# Patient Record
Sex: FEMALE | URBAN - METROPOLITAN AREA
[De-identification: names, ages, dates, MRNs, and addresses within clinical notes are randomized per-mention and may not be internally consistent; named-entity substitution may affect disease eponyms.]

---

## 2023-06-27 ENCOUNTER — HOSPITAL ENCOUNTER (OUTPATIENT)
Age: 43
Setting detail: SPECIMEN
Discharge: HOME | End: 2023-06-27

## 2023-06-27 ENCOUNTER — OFFICE VISIT (OUTPATIENT)
Dept: OBGYN CLINIC | Age: 43
End: 2023-06-27
Payer: COMMERCIAL

## 2023-06-27 VITALS
SYSTOLIC BLOOD PRESSURE: 137 MMHG | HEIGHT: 66 IN | WEIGHT: 231 LBS | HEART RATE: 87 BPM | DIASTOLIC BLOOD PRESSURE: 96 MMHG | BODY MASS INDEX: 37.12 KG/M2

## 2023-06-27 DIAGNOSIS — K42.9 UMBILICAL HERNIA WITHOUT OBSTRUCTION AND WITHOUT GANGRENE: ICD-10-CM

## 2023-06-27 DIAGNOSIS — N83.201 RIGHT OVARIAN CYST: ICD-10-CM

## 2023-06-27 DIAGNOSIS — R19.00 PELVIC MASS: ICD-10-CM

## 2023-06-27 DIAGNOSIS — R19.00 PELVIC MASS: Primary | ICD-10-CM

## 2023-06-27 DIAGNOSIS — R10.33 PERIUMBILICAL ABDOMINAL PAIN: ICD-10-CM

## 2023-06-27 DIAGNOSIS — R68.81 EARLY SATIETY: ICD-10-CM

## 2023-06-27 LAB
CANCER AG125 SERPL-ACNC: 24 U/ML
CANCER AG19-9 SERPL IA-ACNC: 24 U/ML (ref 0–35)
CEA SERPL-MCNC: 2.5 NG/ML
LDH SERPL-CCNC: 178 U/L (ref 135–214)

## 2023-06-27 PROCEDURE — 99204 OFFICE O/P NEW MOD 45 MIN: CPT | Performed by: OBSTETRICS & GYNECOLOGY

## 2023-06-27 RX ORDER — LISINOPRIL 5 MG/1
5 TABLET ORAL DAILY
COMMUNITY
Start: 2023-06-16

## 2023-06-27 RX ORDER — SIMVASTATIN 40 MG
40 TABLET ORAL DAILY
COMMUNITY
Start: 2023-05-01

## 2023-06-27 RX ORDER — SERTRALINE HYDROCHLORIDE 100 MG/1
100 TABLET, FILM COATED ORAL DAILY
COMMUNITY
Start: 2023-04-27

## 2023-06-27 RX ORDER — LEVOTHYROXINE SODIUM 200 MCG
200 TABLET ORAL DAILY
COMMUNITY
Start: 2023-05-04

## 2023-06-27 RX ORDER — INSULIN GLARGINE 300 U/ML
INJECTION, SOLUTION SUBCUTANEOUS
COMMUNITY
Start: 2023-06-17

## 2023-06-27 RX ORDER — BREXPIPRAZOLE 1 MG/1
1 TABLET ORAL DAILY
COMMUNITY
Start: 2023-06-16

## 2023-06-27 RX ORDER — HYDROCHLOROTHIAZIDE 12.5 MG/1
12.5 TABLET ORAL DAILY
COMMUNITY
Start: 2023-06-22

## 2023-06-28 LAB
HPV I/H RISK 4 DNA CVX QL NAA+PROBE: DETECTED
HPV SAMPLE: ABNORMAL
HPV, INTERPRETATION: ABNORMAL
HPV16 DNA CVX QL NAA+PROBE: NOT DETECTED
HPV18 DNA CVX QL NAA+PROBE: NOT DETECTED
SPECIMEN DESCRIPTION: ABNORMAL

## 2023-06-29 DIAGNOSIS — R19.00 PELVIC MASS: Primary | ICD-10-CM

## 2023-06-29 LAB — CANCER AG15-3 SERPL-ACNC: 17 U/ML (ref 0–31)

## 2023-07-03 LAB — CYTOLOGY REPORT: NORMAL

## 2023-07-05 ENCOUNTER — HOSPITAL ENCOUNTER (OUTPATIENT)
Dept: ULTRASOUND IMAGING | Facility: CLINIC | Age: 43
Discharge: HOME | End: 2023-07-07
Payer: COMMERCIAL

## 2023-07-05 ENCOUNTER — HOSPITAL ENCOUNTER (OUTPATIENT)
Dept: CT IMAGING | Facility: CLINIC | Age: 43
Discharge: HOME | End: 2023-07-07
Attending: OBSTETRICS & GYNECOLOGY
Payer: COMMERCIAL

## 2023-07-05 DIAGNOSIS — R19.00 PELVIC MASS: ICD-10-CM

## 2023-07-05 LAB
CREAT SERPL-MCNC: 0.5 MG/DL (ref 0.5–0.9)
GFR SERPL CREATININE-BSD FRML MDRD: >60 ML/MIN/1.73M2

## 2023-07-05 PROCEDURE — 36415 COLL VENOUS BLD VENIPUNCTURE: CPT

## 2023-07-05 PROCEDURE — 2580000003 HC RX 258: Performed by: OBSTETRICS & GYNECOLOGY

## 2023-07-05 PROCEDURE — 82565 ASSAY OF CREATININE: CPT

## 2023-07-05 PROCEDURE — 6360000004 HC RX CONTRAST MEDICATION: Performed by: OBSTETRICS & GYNECOLOGY

## 2023-07-05 PROCEDURE — 76856 US EXAM PELVIC COMPLETE: CPT

## 2023-07-05 PROCEDURE — 71260 CT THORAX DX C+: CPT

## 2023-07-05 RX ORDER — 0.9 % SODIUM CHLORIDE 0.9 %
70 INTRAVENOUS SOLUTION INTRAVENOUS ONCE
Status: COMPLETED | OUTPATIENT
Start: 2023-07-05 | End: 2023-07-05

## 2023-07-05 RX ORDER — SODIUM CHLORIDE 0.9 % (FLUSH) 0.9 %
10 SYRINGE (ML) INJECTION PRN
Status: DISCONTINUED | OUTPATIENT
Start: 2023-07-05 | End: 2023-07-08 | Stop reason: HOSPADM

## 2023-07-05 RX ADMIN — IOPAMIDOL 75 ML: 755 INJECTION, SOLUTION INTRAVENOUS at 15:28

## 2023-07-05 RX ADMIN — SODIUM CHLORIDE 70 ML: 9 INJECTION, SOLUTION INTRAVENOUS at 15:27

## 2023-07-05 RX ADMIN — SODIUM CHLORIDE, PRESERVATIVE FREE 10 ML: 5 INJECTION INTRAVENOUS at 15:26

## 2023-07-10 ENCOUNTER — TELEPHONE (OUTPATIENT)
Dept: OBGYN CLINIC | Age: 43
End: 2023-07-10

## 2023-07-10 NOTE — TELEPHONE ENCOUNTER
GYN pt last seen 6/27/23     US was ordered along on 7/5 with some labs and pt was wondering what it meant by benign characteristics and was also wondering the results to her labs. Pls advise.

## 2023-07-11 DIAGNOSIS — R10.33 PERIUMBILICAL ABDOMINAL PAIN: Primary | ICD-10-CM

## 2023-07-11 DIAGNOSIS — K42.9 UMBILICAL HERNIA WITHOUT OBSTRUCTION AND WITHOUT GANGRENE: ICD-10-CM

## 2023-07-14 ENCOUNTER — TELEPHONE (OUTPATIENT)
Dept: OBGYN CLINIC | Age: 43
End: 2023-07-14

## 2023-07-14 NOTE — TELEPHONE ENCOUNTER
Referred to General surgeon but has not heard back from their office. Gave pt number to Dr. Delphine Gilbert office. Asked pt to call back if they don't see her referral and get a fax number. Pt agreeable to POC.

## 2023-07-18 ENCOUNTER — TELEPHONE (OUTPATIENT)
Dept: OBGYN CLINIC | Age: 43
End: 2023-07-18

## 2023-07-18 NOTE — TELEPHONE ENCOUNTER
GYN pt calling in stating she is having surgery for an ovari removal and was wondering if she was having both or one removed as she would just want both removed just incase as she has dealt with a previous surgery of thyroid cancer and them doing one side and them having to do a whole other surgery to do the other. Pt also states that she seen Dr Delphine Gilbert yesterday and states he wants to do his surgery the same day for hernia repair and would like top collaborate to schedule that surgery.

## 2023-07-19 ENCOUNTER — TELEPHONE (OUTPATIENT)
Dept: OBGYN CLINIC | Age: 43
End: 2023-07-19

## 2023-07-19 NOTE — TELEPHONE ENCOUNTER
GYN pt having robotic surgery for hyst 8/29 pt called in with questions in regards to length of recovery for surgery? How long she will have to stay in the hospital? 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 Pt also wants to know if she should take her medication the morning of surgery?

## 2023-07-19 NOTE — TELEPHONE ENCOUNTER
Gyn pt     Scheduled for surgery 8.29.23 @0900   PAT 8.15.23 @ [address] 1300     Arrive 2hrs early 0700  NPO after midnight

## 2023-08-05 NOTE — PATIENT INSTRUCTIONS
Please carefully follow all the preoperative instructions given to you. Plan on returning to the office 1-2 weeks after surgery. Please call the office if you have any questions or concerns before or after surgery.

## 2023-08-07 ENCOUNTER — INITIAL CONSULT (OUTPATIENT)
Dept: OBGYN CLINIC | Age: 43
End: 2023-08-07
Payer: COMMERCIAL

## 2023-08-07 VITALS
SYSTOLIC BLOOD PRESSURE: 134 MMHG | BODY MASS INDEX: 37.77 KG/M2 | DIASTOLIC BLOOD PRESSURE: 96 MMHG | WEIGHT: 235 LBS | HEIGHT: 66 IN | HEART RATE: 82 BPM

## 2023-08-07 DIAGNOSIS — Z01.818 PREOPERATIVE EXAM FOR GYNECOLOGIC SURGERY: ICD-10-CM

## 2023-08-07 DIAGNOSIS — N83.201 RIGHT OVARIAN CYST: ICD-10-CM

## 2023-08-07 DIAGNOSIS — R19.00 PELVIC MASS: Primary | ICD-10-CM

## 2023-08-07 DIAGNOSIS — R10.2 PELVIC PRESSURE IN FEMALE: ICD-10-CM

## 2023-08-07 DIAGNOSIS — K42.9 UMBILICAL HERNIA WITHOUT OBSTRUCTION AND WITHOUT GANGRENE: ICD-10-CM

## 2023-08-07 PROCEDURE — 99213 OFFICE O/P EST LOW 20 MIN: CPT | Performed by: OBSTETRICS & GYNECOLOGY

## 2023-08-07 RX ORDER — LORATADINE 10 MG/1
CAPSULE, LIQUID FILLED ORAL DAILY
COMMUNITY

## 2023-08-07 RX ORDER — ZINC GLUCONATE 50 MG
50 TABLET ORAL DAILY
COMMUNITY

## 2023-08-07 RX ORDER — FLUTICASONE PROPIONATE 50 MCG
1 SPRAY, SUSPENSION (ML) NASAL DAILY
COMMUNITY

## 2023-08-07 RX ORDER — ASPIRIN 81 MG/1
81 TABLET, CHEWABLE ORAL DAILY
COMMUNITY

## 2023-08-07 RX ORDER — PEN NEEDLE, DIABETIC 32GX 5/32"
NEEDLE, DISPOSABLE MISCELLANEOUS
COMMUNITY
Start: 2023-08-01

## 2023-08-07 NOTE — PROGRESS NOTES
[facility] [place]ural  [address] [place] [address]      DATE OF VISIT:  23        History and Physical    Bernardo Vásquez    :  1980  CHIEF COMPLAINT:  No chief complaint on file. HPI :   Bernardo Vásquez is a 37 y.o. femaleGRAVIDA 2 PARA 1011   PCP: Garrett Shaheen was initially evaluated in the office as a new visit referral from her general surgeon. She was referred to him by her PCP to evaluate a possible hernia. An abdominal/pelvic ultrasound was done that showed an incidental right ovarian cyst measuring 13 x 10 cm. No hernia was visualized and no other information was commented on? She states that she had been experiencing intermittent umbilical discomfort and noticed a bulge in her abdomen above the umbilicus. The pain is sharp, random and not exacerbated with any physical activities. Pain does not radiate and no associated nausea or vomiting. With meals however she does feel early fullness, bloating and occasionally gassy. She is having regular bowel movements without constipation or diarrhea. She does sometimes feel after urination that she is incompletely emptied her bladder but denies any UTI symptoms or involuntary loss of urine. She does have menstrual cycles approximately every 26-28 days lasting 5 days on average with moderate flow. She denies any intermenstrual bleeding or history of abnormal Paps. She is sexually active and has completed her childbearing. Last mammogram was in March and WNL. She does have a history of thyroid cancer with thyroidectomy and is currently on replacement. TFTs last month she reported as being WNL. She is otherwise without any significant complaints today. A referral was made to Dr. Cecily Almanzar who is seen, evaluated her and has consented her for a combination robotic umbilical herniorrhaphy.     Narrative

## 2023-08-10 RX ORDER — SODIUM CHLORIDE, SODIUM LACTATE, POTASSIUM CHLORIDE, CALCIUM CHLORIDE 600; 310; 30; 20 MG/100ML; MG/100ML; MG/100ML; MG/100ML
INJECTION, SOLUTION INTRAVENOUS CONTINUOUS
OUTPATIENT
Start: 2023-08-10

## 2023-08-10 NOTE — DISCHARGE INSTRUCTIONS
disease (chicken pox, measles, etc.) Please call your doctor before coming to the hospital.      If your child is having surgery please make arrangements for any other children to be cared for at home on the day of surgery. Other children are not permitted in recovery room and we want you to be able to spend time with the patient. If other arrangements are not available then we suggest that you have a second adult to stay in the waiting room.       If you have any other questions regarding your procedure or the day of surgery, please call [phone], or [phone]

## 2023-08-11 ENCOUNTER — TELEPHONE (OUTPATIENT)
Dept: OBGYN CLINIC | Age: 43
End: 2023-08-11

## 2023-08-11 NOTE — TELEPHONE ENCOUNTER
Not sure who this would go to as Sherlyn and Judy Earl both are off [facility] [place].

## 2023-08-11 NOTE — TELEPHONE ENCOUNTER
Hi, I have both of you working with Dr. Marcelo Small Monday and Tuesday. Can either of you make sure orders are placed for this patient.  PAT is scheduled for Tuesday at 1pm.

## 2023-08-15 ENCOUNTER — HOSPITAL ENCOUNTER (OUTPATIENT)
Age: 43
Discharge: HOME | End: 2023-08-15
Payer: COMMERCIAL

## 2023-08-15 ENCOUNTER — HOSPITAL ENCOUNTER (OUTPATIENT)
Dept: PREADMISSION TESTING | Age: 43
Discharge: HOME | End: 2023-08-19
Payer: COMMERCIAL

## 2023-08-15 VITALS
BODY MASS INDEX: 37.77 KG/M2 | DIASTOLIC BLOOD PRESSURE: 78 MMHG | HEIGHT: 66 IN | HEART RATE: 68 BPM | OXYGEN SATURATION: 98 % | TEMPERATURE: 97.4 F | SYSTOLIC BLOOD PRESSURE: 126 MMHG | RESPIRATION RATE: 16 BRPM | WEIGHT: 235 LBS

## 2023-08-15 DIAGNOSIS — N83.201 RIGHT OVARIAN CYST: ICD-10-CM

## 2023-08-15 DIAGNOSIS — K42.9 UMBILICAL HERNIA WITHOUT OBSTRUCTION AND WITHOUT GANGRENE: ICD-10-CM

## 2023-08-15 DIAGNOSIS — R19.00 PELVIC MASS: ICD-10-CM

## 2023-08-15 DIAGNOSIS — Z01.818 PREOPERATIVE EXAM FOR GYNECOLOGIC SURGERY: ICD-10-CM

## 2023-08-15 DIAGNOSIS — R10.2 PELVIC PRESSURE IN FEMALE: ICD-10-CM

## 2023-08-15 LAB
ABO + RH BLD: NORMAL
ALBUMIN SERPL-MCNC: 4.4 G/DL (ref 3.5–5.2)
ALBUMIN/GLOB SERPL: 1.6 {RATIO} (ref 1–2.5)
ALP SERPL-CCNC: 77 U/L (ref 35–104)
ALT SERPL-CCNC: 28 U/L (ref 5–33)
ANION GAP SERPL CALCULATED.3IONS-SCNC: 14 MMOL/L (ref 9–17)
ARM BAND NUMBER: NORMAL
AST SERPL-CCNC: 20 U/L
B-HCG SERPL EIA 3RD IS-ACNC: <1 MIU/ML
BASOPHILS # BLD: 0.13 K/UL (ref 0–0.2)
BASOPHILS NFR BLD: 2 % (ref 0–2)
BILIRUB SERPL-MCNC: 0.3 MG/DL (ref 0.3–1.2)
BLOOD BANK SAMPLE EXPIRATION: NORMAL
BLOOD GROUP ANTIBODIES SERPL: NEGATIVE
BUN SERPL-MCNC: 9 MG/DL (ref 6–20)
CALCIUM SERPL-MCNC: 9.3 MG/DL (ref 8.6–10.4)
CHLORIDE SERPL-SCNC: 100 MMOL/L (ref 98–107)
CO2 SERPL-SCNC: 23 MMOL/L (ref 20–31)
CREAT SERPL-MCNC: 0.7 MG/DL (ref 0.5–0.9)
EOSINOPHIL # BLD: 0.3 K/UL (ref 0–0.44)
EOSINOPHILS RELATIVE PERCENT: 5 % (ref 1–4)
ERYTHROCYTE [DISTWIDTH] IN BLOOD BY AUTOMATED COUNT: 12.3 % (ref 11.8–14.4)
GFR SERPL CREATININE-BSD FRML MDRD: >60 ML/MIN/1.73M2
GLUCOSE SERPL-MCNC: 356 MG/DL (ref 70–99)
HCG UR QL: NEGATIVE
HCT VFR BLD AUTO: 43.8 % (ref 36.3–47.1)
HGB BLD-MCNC: 15 G/DL (ref 11.9–15.1)
IMM GRANULOCYTES # BLD AUTO: <0.03 K/UL (ref 0–0.3)
IMM GRANULOCYTES NFR BLD: 0 %
LYMPHOCYTES NFR BLD: 2 K/UL (ref 1.1–3.7)
LYMPHOCYTES RELATIVE PERCENT: 32 % (ref 24–43)
MCH RBC QN AUTO: 31.4 PG (ref 25.2–33.5)
MCHC RBC AUTO-ENTMCNC: 34.2 G/DL (ref 28.4–34.8)
MCV RBC AUTO: 91.8 FL (ref 82.6–102.9)
MONOCYTES NFR BLD: 0.46 K/UL (ref 0.1–1.2)
MONOCYTES NFR BLD: 7 % (ref 3–12)
NEUTROPHILS NFR BLD: 54 % (ref 36–65)
NEUTS SEG NFR BLD: 3.34 K/UL (ref 1.5–8.1)
NRBC BLD-RTO: 0 PER 100 WBC
PLATELET # BLD AUTO: 210 K/UL (ref 138–453)
PMV BLD AUTO: 11.7 FL (ref 8.1–13.5)
POTASSIUM SERPL-SCNC: 4.4 MMOL/L (ref 3.7–5.3)
PROT SERPL-MCNC: 7.1 G/DL (ref 6.4–8.3)
RBC # BLD AUTO: 4.77 M/UL (ref 3.95–5.11)
SODIUM SERPL-SCNC: 137 MMOL/L (ref 135–144)
WBC OTHER # BLD: 6.2 K/UL (ref 3.5–11.3)

## 2023-08-15 PROCEDURE — 36415 COLL VENOUS BLD VENIPUNCTURE: CPT

## 2023-08-15 PROCEDURE — 86900 BLOOD TYPING SEROLOGIC ABO: CPT

## 2023-08-15 PROCEDURE — 81025 URINE PREGNANCY TEST: CPT

## 2023-08-15 PROCEDURE — 86850 RBC ANTIBODY SCREEN: CPT

## 2023-08-15 PROCEDURE — 84702 CHORIONIC GONADOTROPIN TEST: CPT

## 2023-08-15 PROCEDURE — 86901 BLOOD TYPING SEROLOGIC RH(D): CPT

## 2023-08-15 PROCEDURE — 80053 COMPREHEN METABOLIC PANEL: CPT

## 2023-08-15 PROCEDURE — 85025 COMPLETE CBC W/AUTO DIFF WBC: CPT

## 2023-08-15 RX ORDER — ENOXAPARIN SODIUM 100 MG/ML
40 INJECTION SUBCUTANEOUS ONCE
OUTPATIENT
Start: 2023-08-15 | End: 2023-08-15

## 2023-08-15 ASSESSMENT — PAIN SCALES - GENERAL: PAINLEVEL_OUTOF10: 2

## 2023-08-15 ASSESSMENT — PAIN DESCRIPTION - LOCATION: LOCATION: ABDOMEN

## 2023-08-15 ASSESSMENT — PAIN DESCRIPTION - ORIENTATION: ORIENTATION: LOWER

## 2023-08-15 NOTE — PROGRESS NOTES
Anesthesia Focused Assessment    STOP-BANG Sleep Apnea Questionnaire    SNORE loudly (heard through closed doors)? No  TIRED, fatigued, sleepy during daytime? No  OBSERVED stopping breathing during sleep? No  High blood PRESSURE being treated? Yes    BMI over 35? Yes  AGE over 48? No  NECK circumference over 16\"? No  GENDER (male)? No             Total 2  High risk 5-8  Intermediate risk 3-4  Low risk 0-2    Obstructive Sleep Apnea: denies  If YES, machine used: no     Type 1 DM:   no  T2DM:  yes    Coronary Artery Disease:  no  Hypertension:  yes    Active smoker:  no  Drinks Alcohol:  no    Dentition: benign    Defib / AICD / Pacemaker: no      Renal Failure/dialysis:  no    Patient was evaluated in PAT & anesthesia guidelines were applied. NPO guidelines, medication instructions and scheduled arrival time were reviewed with patient. I advised patient to please contact the surgeon's office, ahead of time if possible, if any new signs or symptoms of illness, infection, rash, etc    Hx of anesthesia complications:  PONV - scop patch helped  Family hx of anesthesia complications:  no                                                                                                                     Patient is active without cardiac or pulmonary complaints.     Deanna Blue PA-C  8/15/23  12:51 PM

## 2023-08-15 NOTE — PROGRESS NOTES
Notified Levon Simmonds of 356 glucose she will start patient on short acting insulin/sliding scale.

## 2023-08-16 LAB
EKG ATRIAL RATE: 65 BPM
EKG P AXIS: 9 DEGREES
EKG P-R INTERVAL: 154 MS
EKG Q-T INTERVAL: 422 MS
EKG QRS DURATION: 88 MS
EKG QTC CALCULATION (BAZETT): 438 MS
EKG R AXIS: 4 DEGREES
EKG T AXIS: 11 DEGREES
EKG VENTRICULAR RATE: 65 BPM

## 2023-08-17 ENCOUNTER — ANESTHESIA EVENT (OUTPATIENT)
Dept: OPERATING ROOM | Age: 43
End: 2023-08-17
Payer: COMMERCIAL

## 2023-08-17 ENCOUNTER — TELEPHONE (OUTPATIENT)
Dept: OBGYN CLINIC | Age: 43
End: 2023-08-17

## 2023-08-17 NOTE — TELEPHONE ENCOUNTER
36 y/o Surgical pt calling, returning call back to Dr. Brigitte Wesley. Pt state's she is unsure why doctor is calling because she has not questions and it ready for surgery. Informed pt that doctor is in with pt at this time and will call her back.

## 2023-08-17 NOTE — TELEPHONE ENCOUNTER
Called pt back to discuss her elevated . Surgery could potentially get cancelled if her  BS are not controlled. Pt states SV must have contacted her PCP office regarding this value because he meds have changed. Her BS values have gone down. She has been charting them. Asked pt to get clearance from her PCP. Gave pt fax number to office after she is seen by her PCP. Informed pt that she will need to bring her meds in the day of surgery. Pt verbalizes understanding. Pt asking what number value would anesthesia except to be able to do surgery. Informed pt this writer doesn't know the cut off.

## 2023-08-28 NOTE — H&P
OB/GYN Pre-Op H&P  Juanita Barrow    Patient Name: Dorota Andujar     Patient : 1980  Room/Bed: 181 [facility]  Admission Date/Time: 2023  6:31 AM  Primary Care Physician: Darrian Williamson  MRN: [id number]    Date: 2023  Time: 8:53 AM    The patient was seen in pre-op holding. She is here for a previously scheduled exploratory laparotomy, removal of pelvic mass, removal of right tuba and ovary, removal of left tube, possible cancer staging. Patient was referred to OB/GYN by general surgery after evaluation for umbilical hernia repair and patient was found to have a right adnexal cyst measuring [id number]. Patient will have definitive surgical management for removal of pelvic mass. She has completed her childbearing and additionally desires to have ovarian cancer reduction salpingectomy on the left side. The procedure risks and complications were reviewed. The labs, Consent, and H&P were reviewed and updated. The patient was counseled on the possibility of  the need of a second surgery. The patient voiced understanding and had all of her questions answered. The possibility of incomplete removal of abnormal tissue was discussed. OBSTETRICAL HISTORY:   OB History    Para Term  AB Living   2 2 1 1 0 1   SAB IAB Ectopic Molar Multiple Live Births   0 0 0 0 0 2      # Outcome Date GA Lbr Adam/2nd Weight Sex Delivery Anes PTL Lv   2       Vag-Spont   ND   1 Term      Vag-Spont   TELLY       PAST MEDICAL HISTORY:   has a past medical history of Diabetes mellitus ([address]), Environmental allergies, Hx of thyroid cancer, Hyperlipidemia, Hypertension, Ovarian cyst, PONV (postoperative nausea and vomiting), Thyroid disease, and Wellness examination. PAST SURGICAL HISTORY:   has a past surgical history that includes Cholecystectomy (N/A, ); Thyroidectomy (Right, 2012); Mole removal (Left); and Thyroidectomy (Left, 2012).     ALLERGIES:  Allergies as of

## 2023-08-28 NOTE — H&P
[facility] [place]  [address] [place]    :  1980      Updated CHIEF COMPLAINT: HPI   DATE 23    Zunilda Montenegro is a 37 y.o. femaleGRAVIDA 2 PARA 1011     PCP: Jaida Seda was initially evaluated in the office as a new visit referral from her general surgeon. She was referred to him by her PCP to evaluate a possible hernia. An abdominal/pelvic ultrasound was done that showed an incidental right ovarian cyst measuring 13 x 10 cm. No hernia was visualized and no other information was commented on? She states that she had been experiencing intermittent umbilical discomfort and noticed a bulge in her abdomen above the umbilicus. The pain is sharp, random and not exacerbated with any physical activities. Pain does not radiate and no associated nausea or vomiting. With meals however she does feel early fullness, bloating and occasionally gassy. She is having regular bowel movements without constipation or diarrhea. She does sometimes feel after urination that she is incompletely emptied her bladder but denies any UTI symptoms or involuntary loss of urine. She does have menstrual cycles approximately every 26-28 days lasting 5 days on average with moderate flow. She denies any intermenstrual bleeding or history of abnormal Paps. She is sexually active and has completed her childbearing. Last mammogram was in March and WNL. She does have a history of thyroid cancer with thyroidectomy and is currently on replacement. TFTs last month she reported as being WNL. She is otherwise without any significant complaints today. A referral was made to Dr. Jesus Arreola who's seen, evaluated her and has consented her for a combination robotic umbilical herniorrhaphy.      Narrative & Impression  EXAMINATION:  TRANSABDOMINAL AND TRANSVAGINAL PELVIC Cervical Cancer Neg Hx      Vaginal Cancer Neg Hx      Colon Cancer Neg Hx           Social History          Tobacco Use   Smoking Status Never   Smokeless Tobacco Never      Social History          Substance and Sexual Activity   Alcohol Use Not Currently      Current Facility-Administered Medications          Current Outpatient Medications   Medication Sig Dispense Refill    hydroCHLOROthiazide (HYDRODIURIL) 12.5 MG tablet Take 1 tablet by mouth daily        REXULTI 1 MG TABS tablet Take 1 tablet by mouth daily        TOUJEO SOLOSTAR 300 UNIT/ML concentrated injection pen INJECT 10 UNITS SUBCUTANEOUSLY ONCE DAILY        SYNTHROID 200 MCG tablet Take 1 tablet by mouth daily        lisinopril (PRINIVIL;ZESTRIL) 5 MG tablet Take 1 tablet by mouth daily        metFORMIN (GLUCOPHAGE) 500 MG tablet Take 1 tablet by mouth 2 times daily        sertraline (ZOLOFT) 100 MG tablet Take 1 tablet by mouth daily        simvastatin (ZOCOR) 40 MG tablet Take 1 tablet by mouth daily          No current facility-administered medications for this visit. Allergies:  No Known Allergies     Gynecologic History:  No LMP recorded. (Menstrual status: Irregular periods).   Sexually Active: Yes  STD History: No  Abnormal Pap History no  Birth Control: No              OB History    Para Term  AB Living   2 2 1 1 0 1   SAB IAB Ectopic Molar Multiple Live Births    0 0 0 0 0 2      ______________________________________________________________________  REVIEW OF SYSTEMS:        Constitutional:             Unexpected weight change    no  Neurological:               Frequent headaches               no  Ophthalmic:                 Recent visual changes           no  ENT:                            Difficulty swallowing                no  Breast:                         Masses                                   no                                  Respiratory:                 Shortness of breath                no

## 2023-08-29 ENCOUNTER — HOSPITAL ENCOUNTER (OUTPATIENT)
Age: 43
Setting detail: OUTPATIENT SURGERY
Discharge: HOME | End: 2023-08-29
Attending: OBSTETRICS & GYNECOLOGY | Admitting: OBSTETRICS & GYNECOLOGY
Payer: COMMERCIAL

## 2023-08-29 ENCOUNTER — ANESTHESIA (OUTPATIENT)
Dept: OPERATING ROOM | Age: 43
End: 2023-08-29
Payer: COMMERCIAL

## 2023-08-29 VITALS
HEIGHT: 66 IN | HEART RATE: 101 BPM | BODY MASS INDEX: 36.96 KG/M2 | DIASTOLIC BLOOD PRESSURE: 76 MMHG | SYSTOLIC BLOOD PRESSURE: 103 MMHG | TEMPERATURE: 97.3 F | OXYGEN SATURATION: 92 % | WEIGHT: 230 LBS | RESPIRATION RATE: 12 BRPM

## 2023-08-29 DIAGNOSIS — N83.201 CYST OF RIGHT OVARY: ICD-10-CM

## 2023-08-29 DIAGNOSIS — R19.00 PELVIC MASS: ICD-10-CM

## 2023-08-29 DIAGNOSIS — K42.9 UMBILICAL HERNIA WITHOUT OBSTRUCTION AND WITHOUT GANGRENE: ICD-10-CM

## 2023-08-29 DIAGNOSIS — G89.18 POST-OP PAIN: Primary | ICD-10-CM

## 2023-08-29 PROBLEM — Z98.890 POST-OPERATIVE STATE: Status: ACTIVE | Noted: 2023-08-29

## 2023-08-29 LAB
GLUCOSE BLD-MCNC: 203 MG/DL (ref 65–105)
GLUCOSE BLD-MCNC: 276 MG/DL (ref 65–105)
HCG, PREGNANCY URINE (POC): NEGATIVE

## 2023-08-29 PROCEDURE — 7100000000 HC PACU RECOVERY - FIRST 15 MIN: Performed by: OBSTETRICS & GYNECOLOGY

## 2023-08-29 PROCEDURE — 3600000014 HC SURGERY LEVEL 4 ADDTL 15MIN: Performed by: OBSTETRICS & GYNECOLOGY

## 2023-08-29 PROCEDURE — 2580000003 HC RX 258: Performed by: NURSE ANESTHETIST, CERTIFIED REGISTERED

## 2023-08-29 PROCEDURE — 81025 URINE PREGNANCY TEST: CPT

## 2023-08-29 PROCEDURE — 3700000000 HC ANESTHESIA ATTENDED CARE: Performed by: OBSTETRICS & GYNECOLOGY

## 2023-08-29 PROCEDURE — 6360000002 HC RX W HCPCS: Performed by: SURGERY

## 2023-08-29 PROCEDURE — 88305 TISSUE EXAM BY PATHOLOGIST: CPT

## 2023-08-29 PROCEDURE — 2580000003 HC RX 258: Performed by: OBSTETRICS & GYNECOLOGY

## 2023-08-29 PROCEDURE — 1200000000 HC SEMI PRIVATE

## 2023-08-29 PROCEDURE — 2580000003 HC RX 258: Performed by: ANESTHESIOLOGY

## 2023-08-29 PROCEDURE — 7100000010 HC PHASE II RECOVERY - FIRST 15 MIN: Performed by: OBSTETRICS & GYNECOLOGY

## 2023-08-29 PROCEDURE — 6360000002 HC RX W HCPCS

## 2023-08-29 PROCEDURE — 3700000001 HC ADD 15 MINUTES (ANESTHESIA): Performed by: OBSTETRICS & GYNECOLOGY

## 2023-08-29 PROCEDURE — 7100000011 HC PHASE II RECOVERY - ADDTL 15 MIN: Performed by: OBSTETRICS & GYNECOLOGY

## 2023-08-29 PROCEDURE — 82947 ASSAY GLUCOSE BLOOD QUANT: CPT

## 2023-08-29 PROCEDURE — 6360000002 HC RX W HCPCS: Performed by: STUDENT IN AN ORGANIZED HEALTH CARE EDUCATION/TRAINING PROGRAM

## 2023-08-29 PROCEDURE — 6360000002 HC RX W HCPCS: Performed by: NURSE ANESTHETIST, CERTIFIED REGISTERED

## 2023-08-29 PROCEDURE — 6370000000 HC RX 637 (ALT 250 FOR IP): Performed by: STUDENT IN AN ORGANIZED HEALTH CARE EDUCATION/TRAINING PROGRAM

## 2023-08-29 PROCEDURE — 6360000002 HC RX W HCPCS: Performed by: OBSTETRICS & GYNECOLOGY

## 2023-08-29 PROCEDURE — 2500000003 HC RX 250 WO HCPCS: Performed by: NURSE ANESTHETIST, CERTIFIED REGISTERED

## 2023-08-29 PROCEDURE — 7100000001 HC PACU RECOVERY - ADDTL 15 MIN: Performed by: OBSTETRICS & GYNECOLOGY

## 2023-08-29 PROCEDURE — 2720000010 HC SURG SUPPLY STERILE: Performed by: OBSTETRICS & GYNECOLOGY

## 2023-08-29 PROCEDURE — 3600000004 HC SURGERY LEVEL 4 BASE: Performed by: OBSTETRICS & GYNECOLOGY

## 2023-08-29 PROCEDURE — 2709999900 HC NON-CHARGEABLE SUPPLY: Performed by: OBSTETRICS & GYNECOLOGY

## 2023-08-29 PROCEDURE — 88307 TISSUE EXAM BY PATHOLOGIST: CPT

## 2023-08-29 RX ORDER — ONDANSETRON 2 MG/ML
4 INJECTION INTRAMUSCULAR; INTRAVENOUS
Status: DISCONTINUED | OUTPATIENT
Start: 2023-08-29 | End: 2023-08-29 | Stop reason: HOSPADM

## 2023-08-29 RX ORDER — ONDANSETRON 4 MG/1
4 TABLET, FILM COATED ORAL DAILY PRN
Qty: 30 TABLET | Refills: 0 | Status: SHIPPED | OUTPATIENT
Start: 2023-08-29

## 2023-08-29 RX ORDER — MIDAZOLAM HYDROCHLORIDE 1 MG/ML
INJECTION INTRAMUSCULAR; INTRAVENOUS PRN
Status: DISCONTINUED | OUTPATIENT
Start: 2023-08-29 | End: 2023-08-29 | Stop reason: SDUPTHER

## 2023-08-29 RX ORDER — GABAPENTIN 600 MG/1
300 TABLET ORAL 3 TIMES DAILY
Qty: 11 TABLET | Refills: 0 | Status: SHIPPED | OUTPATIENT
Start: 2023-08-29 | End: 2023-09-05

## 2023-08-29 RX ORDER — HYDRALAZINE HYDROCHLORIDE 20 MG/ML
10 INJECTION INTRAMUSCULAR; INTRAVENOUS
Status: DISCONTINUED | OUTPATIENT
Start: 2023-08-29 | End: 2023-08-29 | Stop reason: HOSPADM

## 2023-08-29 RX ORDER — GLYCOPYRROLATE 0.2 MG/ML
INJECTION INTRAMUSCULAR; INTRAVENOUS PRN
Status: DISCONTINUED | OUTPATIENT
Start: 2023-08-29 | End: 2023-08-29 | Stop reason: SDUPTHER

## 2023-08-29 RX ORDER — MAGNESIUM HYDROXIDE 1200 MG/15ML
LIQUID ORAL CONTINUOUS PRN
Status: DISCONTINUED | OUTPATIENT
Start: 2023-08-29 | End: 2023-08-29 | Stop reason: HOSPADM

## 2023-08-29 RX ORDER — SODIUM CHLORIDE 0.9 % (FLUSH) 0.9 %
SYRINGE (ML) INJECTION PRN
Status: DISCONTINUED | OUTPATIENT
Start: 2023-08-29 | End: 2023-08-29 | Stop reason: HOSPADM

## 2023-08-29 RX ORDER — SIMVASTATIN 40 MG
40 TABLET ORAL NIGHTLY
Status: CANCELLED | OUTPATIENT
Start: 2023-08-29

## 2023-08-29 RX ORDER — FAMOTIDINE 20 MG/1
20 TABLET, FILM COATED ORAL 2 TIMES DAILY PRN
Status: CANCELLED | OUTPATIENT
Start: 2023-08-29

## 2023-08-29 RX ORDER — FLUTICASONE PROPIONATE 50 MCG
1 SPRAY, SUSPENSION (ML) NASAL DAILY PRN
Status: CANCELLED | OUTPATIENT
Start: 2023-08-29

## 2023-08-29 RX ORDER — CYCLOBENZAPRINE HCL 10 MG
10 TABLET ORAL 3 TIMES DAILY PRN
Qty: 21 TABLET | Refills: 0 | Status: SHIPPED | OUTPATIENT
Start: 2023-08-29 | End: 2023-09-08

## 2023-08-29 RX ORDER — SODIUM CHLORIDE, SODIUM LACTATE, POTASSIUM CHLORIDE, CALCIUM CHLORIDE 600; 310; 30; 20 MG/100ML; MG/100ML; MG/100ML; MG/100ML
INJECTION, SOLUTION INTRAVENOUS CONTINUOUS PRN
Status: DISCONTINUED | OUTPATIENT
Start: 2023-08-29 | End: 2023-08-29 | Stop reason: SDUPTHER

## 2023-08-29 RX ORDER — SODIUM CHLORIDE 0.9 % (FLUSH) 0.9 %
5-40 SYRINGE (ML) INJECTION PRN
Status: CANCELLED | OUTPATIENT
Start: 2023-08-29

## 2023-08-29 RX ORDER — OXYCODONE HYDROCHLORIDE 5 MG/1
5 TABLET ORAL
Status: COMPLETED | OUTPATIENT
Start: 2023-08-29 | End: 2023-08-29

## 2023-08-29 RX ORDER — HYDROCHLOROTHIAZIDE 25 MG/1
12.5 TABLET ORAL DAILY
Status: CANCELLED | OUTPATIENT
Start: 2023-08-29

## 2023-08-29 RX ORDER — INSULIN LISPRO 100 [IU]/ML
0-8 INJECTION, SOLUTION INTRAVENOUS; SUBCUTANEOUS
Status: DISCONTINUED | OUTPATIENT
Start: 2023-08-29 | End: 2023-08-29 | Stop reason: HOSPADM

## 2023-08-29 RX ORDER — ROCURONIUM BROMIDE 10 MG/ML
INJECTION, SOLUTION INTRAVENOUS PRN
Status: DISCONTINUED | OUTPATIENT
Start: 2023-08-29 | End: 2023-08-29 | Stop reason: SDUPTHER

## 2023-08-29 RX ORDER — DIPHENHYDRAMINE HCL 25 MG
25 TABLET ORAL EVERY 6 HOURS PRN
Status: CANCELLED | OUTPATIENT
Start: 2023-08-29

## 2023-08-29 RX ORDER — ENOXAPARIN SODIUM 100 MG/ML
40 INJECTION SUBCUTANEOUS DAILY
Status: CANCELLED | OUTPATIENT
Start: 2023-08-30

## 2023-08-29 RX ORDER — SODIUM CHLORIDE 0.9 % (FLUSH) 0.9 %
5-40 SYRINGE (ML) INJECTION EVERY 12 HOURS SCHEDULED
Status: CANCELLED | OUTPATIENT
Start: 2023-08-29

## 2023-08-29 RX ORDER — OXYCODONE HYDROCHLORIDE 5 MG/1
5 TABLET ORAL EVERY 4 HOURS PRN
Status: CANCELLED | OUTPATIENT
Start: 2023-08-29

## 2023-08-29 RX ORDER — PROPOFOL 10 MG/ML
INJECTION, EMULSION INTRAVENOUS PRN
Status: DISCONTINUED | OUTPATIENT
Start: 2023-08-29 | End: 2023-08-29 | Stop reason: SDUPTHER

## 2023-08-29 RX ORDER — DEXTROSE MONOHYDRATE 100 MG/ML
INJECTION, SOLUTION INTRAVENOUS CONTINUOUS PRN
Status: CANCELLED | OUTPATIENT
Start: 2023-08-29

## 2023-08-29 RX ORDER — SCOLOPAMINE TRANSDERMAL SYSTEM 1 MG/1
1 PATCH, EXTENDED RELEASE TRANSDERMAL
Status: DISCONTINUED | OUTPATIENT
Start: 2023-08-29 | End: 2023-08-29 | Stop reason: HOSPADM

## 2023-08-29 RX ORDER — GABAPENTIN 300 MG/1
300 CAPSULE ORAL 3 TIMES DAILY
Status: CANCELLED | OUTPATIENT
Start: 2023-08-29

## 2023-08-29 RX ORDER — SODIUM CHLORIDE 9 MG/ML
INJECTION, SOLUTION INTRAVENOUS PRN
Status: DISCONTINUED | OUTPATIENT
Start: 2023-08-29 | End: 2023-08-29 | Stop reason: HOSPADM

## 2023-08-29 RX ORDER — FENTANYL CITRATE 50 UG/ML
INJECTION, SOLUTION INTRAMUSCULAR; INTRAVENOUS PRN
Status: DISCONTINUED | OUTPATIENT
Start: 2023-08-29 | End: 2023-08-29 | Stop reason: SDUPTHER

## 2023-08-29 RX ORDER — LIDOCAINE 4 G/G
1 PATCH TOPICAL DAILY
Status: CANCELLED | OUTPATIENT
Start: 2023-08-29

## 2023-08-29 RX ORDER — CETIRIZINE HYDROCHLORIDE 10 MG/1
10 TABLET ORAL DAILY PRN
Status: CANCELLED | OUTPATIENT
Start: 2023-08-29

## 2023-08-29 RX ORDER — ONDANSETRON 2 MG/ML
4 INJECTION INTRAMUSCULAR; INTRAVENOUS EVERY 6 HOURS PRN
Status: CANCELLED | OUTPATIENT
Start: 2023-08-29

## 2023-08-29 RX ORDER — IBUPROFEN 800 MG/1
800 TABLET ORAL EVERY 8 HOURS PRN
Qty: 60 TABLET | Refills: 0 | Status: SHIPPED | OUTPATIENT
Start: 2023-08-29

## 2023-08-29 RX ORDER — LISINOPRIL 5 MG/1
5 TABLET ORAL DAILY
Status: CANCELLED | OUTPATIENT
Start: 2023-08-29

## 2023-08-29 RX ORDER — ENOXAPARIN SODIUM 100 MG/ML
40 INJECTION SUBCUTANEOUS ONCE
Status: COMPLETED | OUTPATIENT
Start: 2023-08-29 | End: 2023-08-29

## 2023-08-29 RX ORDER — INSULIN LISPRO 100 [IU]/ML
0-4 INJECTION, SOLUTION INTRAVENOUS; SUBCUTANEOUS NIGHTLY
Status: CANCELLED | OUTPATIENT
Start: 2023-08-29

## 2023-08-29 RX ORDER — LEVOTHYROXINE SODIUM 0.1 MG/1
200 TABLET ORAL DAILY
Status: CANCELLED | OUTPATIENT
Start: 2023-08-29

## 2023-08-29 RX ORDER — LANOLIN ALCOHOL/MO/W.PET/CERES
3 CREAM (GRAM) TOPICAL NIGHTLY PRN
Status: CANCELLED | OUTPATIENT
Start: 2023-08-29

## 2023-08-29 RX ORDER — LIDOCAINE HYDROCHLORIDE 10 MG/ML
INJECTION, SOLUTION EPIDURAL; INFILTRATION; INTRACAUDAL; PERINEURAL PRN
Status: DISCONTINUED | OUTPATIENT
Start: 2023-08-29 | End: 2023-08-29 | Stop reason: SDUPTHER

## 2023-08-29 RX ORDER — OXYCODONE HYDROCHLORIDE 5 MG/1
10 TABLET ORAL EVERY 4 HOURS PRN
Status: CANCELLED | OUTPATIENT
Start: 2023-08-29

## 2023-08-29 RX ORDER — ONDANSETRON 2 MG/ML
INJECTION INTRAMUSCULAR; INTRAVENOUS PRN
Status: DISCONTINUED | OUTPATIENT
Start: 2023-08-29 | End: 2023-08-29 | Stop reason: SDUPTHER

## 2023-08-29 RX ORDER — BUPIVACAINE HYDROCHLORIDE 2.5 MG/ML
INJECTION, SOLUTION INFILTRATION; PERINEURAL PRN
Status: DISCONTINUED | OUTPATIENT
Start: 2023-08-29 | End: 2023-08-29 | Stop reason: HOSPADM

## 2023-08-29 RX ORDER — DEXAMETHASONE SODIUM PHOSPHATE 10 MG/ML
INJECTION INTRAMUSCULAR; INTRAVENOUS PRN
Status: DISCONTINUED | OUTPATIENT
Start: 2023-08-29 | End: 2023-08-29 | Stop reason: SDUPTHER

## 2023-08-29 RX ORDER — SODIUM CHLORIDE, SODIUM LACTATE, POTASSIUM CHLORIDE, CALCIUM CHLORIDE 600; 310; 30; 20 MG/100ML; MG/100ML; MG/100ML; MG/100ML
INJECTION, SOLUTION INTRAVENOUS CONTINUOUS
Status: CANCELLED | OUTPATIENT
Start: 2023-08-29

## 2023-08-29 RX ORDER — ZINC GLUCONATE 50 MG
50 TABLET ORAL DAILY
Status: CANCELLED | OUTPATIENT
Start: 2023-08-29

## 2023-08-29 RX ORDER — SIMETHICONE 80 MG
80 TABLET,CHEWABLE ORAL 4 TIMES DAILY PRN
Qty: 180 TABLET | Refills: 3 | Status: SHIPPED | OUTPATIENT
Start: 2023-08-29

## 2023-08-29 RX ORDER — SODIUM CHLORIDE 0.9 % (FLUSH) 0.9 %
5-40 SYRINGE (ML) INJECTION PRN
Status: DISCONTINUED | OUTPATIENT
Start: 2023-08-29 | End: 2023-08-29 | Stop reason: HOSPADM

## 2023-08-29 RX ORDER — IBUPROFEN 800 MG/1
800 TABLET ORAL EVERY 8 HOURS SCHEDULED
Status: CANCELLED | OUTPATIENT
Start: 2023-08-29

## 2023-08-29 RX ORDER — ACETAMINOPHEN 500 MG
1000 TABLET ORAL 3 TIMES DAILY
Qty: 540 TABLET | Refills: 1 | Status: SHIPPED | OUTPATIENT
Start: 2023-08-29

## 2023-08-29 RX ORDER — CALCIUM CARBONATE 500 MG/1
500 TABLET, CHEWABLE ORAL 3 TIMES DAILY PRN
Status: CANCELLED | OUTPATIENT
Start: 2023-08-29

## 2023-08-29 RX ORDER — CYCLOBENZAPRINE HCL 10 MG
10 TABLET ORAL 3 TIMES DAILY PRN
Status: CANCELLED | OUTPATIENT
Start: 2023-08-29

## 2023-08-29 RX ORDER — OXYCODONE HYDROCHLORIDE 5 MG/1
5 TABLET ORAL EVERY 6 HOURS PRN
Qty: 20 TABLET | Refills: 0 | Status: SHIPPED | OUTPATIENT
Start: 2023-08-29 | End: 2023-09-05

## 2023-08-29 RX ORDER — SODIUM CHLORIDE 0.9 % (FLUSH) 0.9 %
5-40 SYRINGE (ML) INJECTION EVERY 12 HOURS SCHEDULED
Status: DISCONTINUED | OUTPATIENT
Start: 2023-08-29 | End: 2023-08-29 | Stop reason: HOSPADM

## 2023-08-29 RX ORDER — SODIUM CHLORIDE, SODIUM LACTATE, POTASSIUM CHLORIDE, CALCIUM CHLORIDE 600; 310; 30; 20 MG/100ML; MG/100ML; MG/100ML; MG/100ML
INJECTION, SOLUTION INTRAVENOUS CONTINUOUS
Status: DISCONTINUED | OUTPATIENT
Start: 2023-08-29 | End: 2023-08-29 | Stop reason: HOSPADM

## 2023-08-29 RX ORDER — SODIUM CHLORIDE 9 MG/ML
INJECTION, SOLUTION INTRAVENOUS PRN
Status: CANCELLED | OUTPATIENT
Start: 2023-08-29

## 2023-08-29 RX ORDER — EPHEDRINE SULFATE/0.9% NACL/PF 50 MG/5 ML
SYRINGE (ML) INTRAVENOUS PRN
Status: DISCONTINUED | OUTPATIENT
Start: 2023-08-29 | End: 2023-08-29 | Stop reason: SDUPTHER

## 2023-08-29 RX ORDER — NEOSTIGMINE METHYLSULFATE 5 MG/5 ML
SYRINGE (ML) INTRAVENOUS PRN
Status: DISCONTINUED | OUTPATIENT
Start: 2023-08-29 | End: 2023-08-29 | Stop reason: SDUPTHER

## 2023-08-29 RX ORDER — ACETAMINOPHEN 500 MG
1000 TABLET ORAL EVERY 6 HOURS SCHEDULED
Status: CANCELLED | OUTPATIENT
Start: 2023-08-29

## 2023-08-29 RX ADMIN — ROCURONIUM BROMIDE 20 MG: 10 INJECTION, SOLUTION INTRAVENOUS at 09:48

## 2023-08-29 RX ADMIN — LIDOCAINE HYDROCHLORIDE 50 MG: 10 INJECTION, SOLUTION EPIDURAL; INFILTRATION; INTRACAUDAL; PERINEURAL at 09:16

## 2023-08-29 RX ADMIN — DEXAMETHASONE SODIUM PHOSPHATE 4 MG: 10 INJECTION INTRAMUSCULAR; INTRAVENOUS at 09:23

## 2023-08-29 RX ADMIN — Medication 10 MG: at 10:46

## 2023-08-29 RX ADMIN — HYDROMORPHONE HYDROCHLORIDE 0.5 MG: 1 INJECTION, SOLUTION INTRAMUSCULAR; INTRAVENOUS; SUBCUTANEOUS at 13:31

## 2023-08-29 RX ADMIN — HYDROMORPHONE HYDROCHLORIDE 0.25 MG: 1 INJECTION, SOLUTION INTRAMUSCULAR; INTRAVENOUS; SUBCUTANEOUS at 14:27

## 2023-08-29 RX ADMIN — SODIUM CHLORIDE, POTASSIUM CHLORIDE, SODIUM LACTATE AND CALCIUM CHLORIDE: 600; 310; 30; 20 INJECTION, SOLUTION INTRAVENOUS at 08:51

## 2023-08-29 RX ADMIN — HYDROMORPHONE HYDROCHLORIDE 0.5 MG: 1 INJECTION, SOLUTION INTRAMUSCULAR; INTRAVENOUS; SUBCUTANEOUS at 13:39

## 2023-08-29 RX ADMIN — FENTANYL CITRATE 50 MCG: 0.05 INJECTION, SOLUTION INTRAMUSCULAR; INTRAVENOUS at 10:41

## 2023-08-29 RX ADMIN — ONDANSETRON 4 MG: 2 INJECTION INTRAMUSCULAR; INTRAVENOUS at 11:27

## 2023-08-29 RX ADMIN — ENOXAPARIN SODIUM 40 MG: 100 INJECTION SUBCUTANEOUS at 08:49

## 2023-08-29 RX ADMIN — HYDROMORPHONE HYDROCHLORIDE 0.5 MG: 1 INJECTION, SOLUTION INTRAMUSCULAR; INTRAVENOUS; SUBCUTANEOUS at 12:53

## 2023-08-29 RX ADMIN — PROPOFOL 150 MG: 10 INJECTION, EMULSION INTRAVENOUS at 09:16

## 2023-08-29 RX ADMIN — OXYCODONE HYDROCHLORIDE 5 MG: 5 TABLET ORAL at 15:14

## 2023-08-29 RX ADMIN — HYDROMORPHONE HYDROCHLORIDE 0.5 MG: 1 INJECTION, SOLUTION INTRAMUSCULAR; INTRAVENOUS; SUBCUTANEOUS at 13:06

## 2023-08-29 RX ADMIN — MIDAZOLAM 2 MG: 1 INJECTION INTRAMUSCULAR; INTRAVENOUS at 09:11

## 2023-08-29 RX ADMIN — ROCURONIUM BROMIDE 50 MG: 10 INJECTION, SOLUTION INTRAVENOUS at 09:16

## 2023-08-29 RX ADMIN — ROCURONIUM BROMIDE 20 MG: 10 INJECTION, SOLUTION INTRAVENOUS at 10:10

## 2023-08-29 RX ADMIN — ROCURONIUM BROMIDE 10 MG: 10 INJECTION, SOLUTION INTRAVENOUS at 10:41

## 2023-08-29 RX ADMIN — FENTANYL CITRATE 50 MCG: 0.05 INJECTION, SOLUTION INTRAMUSCULAR; INTRAVENOUS at 11:40

## 2023-08-29 RX ADMIN — FENTANYL CITRATE 150 MCG: 0.05 INJECTION, SOLUTION INTRAMUSCULAR; INTRAVENOUS at 09:16

## 2023-08-29 RX ADMIN — Medication 2000 MG: at 09:35

## 2023-08-29 RX ADMIN — SODIUM CHLORIDE, POTASSIUM CHLORIDE, SODIUM LACTATE AND CALCIUM CHLORIDE: 600; 310; 30; 20 INJECTION, SOLUTION INTRAVENOUS at 09:10

## 2023-08-29 RX ADMIN — ROCURONIUM BROMIDE 10 MG: 10 INJECTION, SOLUTION INTRAVENOUS at 11:11

## 2023-08-29 RX ADMIN — FENTANYL CITRATE 50 MCG: 0.05 INJECTION, SOLUTION INTRAMUSCULAR; INTRAVENOUS at 10:51

## 2023-08-29 RX ADMIN — Medication 20 MG: at 09:31

## 2023-08-29 RX ADMIN — GLYCOPYRROLATE 0.4 MG: 0.2 INJECTION INTRAMUSCULAR; INTRAVENOUS at 11:42

## 2023-08-29 RX ADMIN — Medication 20 MG: at 09:28

## 2023-08-29 RX ADMIN — Medication 3 MG: at 11:42

## 2023-08-29 ASSESSMENT — PAIN SCALES - GENERAL
PAINLEVEL_OUTOF10: 7
PAINLEVEL_OUTOF10: 8
PAINLEVEL_OUTOF10: 8
PAINLEVEL_OUTOF10: 6
PAINLEVEL_OUTOF10: 10
PAINLEVEL_OUTOF10: 6

## 2023-08-29 ASSESSMENT — PAIN DESCRIPTION - LOCATION
LOCATION: ABDOMEN

## 2023-08-29 ASSESSMENT — PAIN - FUNCTIONAL ASSESSMENT: PAIN_FUNCTIONAL_ASSESSMENT: NONE - DENIES PAIN

## 2023-08-29 ASSESSMENT — PAIN DESCRIPTION - DESCRIPTORS: DESCRIPTORS: CRAMPING

## 2023-08-29 NOTE — BRIEF OP NOTE
Brief Operative Note  Department of Obstetrics and Gynecology  [address]     Patient: Quang Houser   : 1980  MRN: [id number]       Acct: [de-identified]   Date of Procedure: 23     Pre-operative Diagnosis: 37 y.o. female     Right Adnexal Cyst  Pelvic Mass  HTN  Type 2 Diabetes Mellitus  History of Thyroid Cancer  S/p Cholecystectomy  S/p Thyroidectomy  BMI 37     Post-operative Diagnosis:   Right Adnexal Cyst  Pelvic Mass  HTN  Type 2 Diabetes Mellitus  History of Thyroid Cancer  S/p Cholecystectomy  S/p Thyroidectomy  BMI 37     Procedure: Exploratory Laparotomy, Bilateral Salpingectomy, Right Ovarian Cystectomy, and Right Oophorectomy    Surgeon: Dr. Monica Cervantes     Assistant(s): Edgar Lewis DO, Rupinder Díaz; Salvador Russo MD, PGY1    Anesthesia: general via ETT    Findings:  normal external genitalia on bimanual exam, fullness noted on right abdominal side with mobile mas, normal appearing uterus, normal appearing bilateral fallopian tubes, normal appearing left ovary with physiologic cysts present, right ovary with 13cm fluid filled simple appearing cyst, no nodules masses or abnormalities noted on omentum mor small bowel  Total IV fluids/Blood products:  1500 ml crystalloid  Urine Output:  refer to op note for final urine output  Estimated blood loss:  10 mL  Drains:  hinojosa catheter to be removed after completion of general surgeries portion  Specimens:  bilateral fallopian tubes, right ovary, and cyst   Instrument and Sponge Count: Correct  Complications:  none  Condition:  stable, case handed over to general surgery for robotic assisted umbilical hernia repair    See full operative report for further details.     Salvador Russo MD  Ob/Gyn Resident  2023, 10:33 AM

## 2023-08-29 NOTE — OP NOTE
Operative Note      Patient: Sean Small  YOB: 1980  MRN: [id number]    Date of Procedure: 8/29/2023    Pre-Op Diagnosis Codes:     * Cyst of right ovary [N83.201]     * Pelvic mass [I13.53]     * Umbilical hernia without obstruction and without gangrene [K42.9]    Post-Op Diagnosis: Same       Procedure(s):  EXPLORATORY LAPAROTOMY, REMOVAL PELVIC MASS, I.E. REMOVAL RIGHT TUBE AND OVARY, REMOVAL LEFT TUBE  UMBILICAL HERNIA REPAIR, TAP BLOCK    Surgeon(s):  Roz Garces MD  Jettie Mom IV, DO    Assistant:   Resident: Graciela Romero DO; Fahad Alatorre DO; Hal Mg MD    Anesthesia: General    Estimated Blood Loss (mL): 15UG    Complications: None    Specimens:   ID Type Source Tests Collected by Time Destination   A : RIGHT OVARY AND TUBE Tissue Ovary SURGICAL PATHOLOGY Roz Garces MD 8/29/2023 7791    B : LEFT FALLOPIAN TUBE Tissue Fallopian Tube SURGICAL PATHOLOGY Roz Garces MD 8/29/2023 0569        Implants:  * No implants in log *      Drains:   Urinary Catheter 08/29/23 Ramírez (Active)       Findings: WC-I, 1cm pre-peritoneal fat containing umbilical hernia primarily repaired    Indications: Patient is a 37y female undergoing open gynecologic non-CA surgery that has concomitant umbilical hernia that has been enlarging and causing discomfort. Plan for combination case with ob/gyn for robotic assisted laparoscopic umbilical hernia repair. Detailed Description of Procedure: The patient was taken over from ob/gyn after they had completed their open procedure and closure the abdomen, their operative note will be in a separate dictation. We then prepped and draped the patient for robotic assisted laparoscopic umbilical hernia repair and a separate time out was performed ensuring the proper patient, procedure, positioning, antibiotics administered ancef, and acknowledging allergies and consent in chart. All present were in agreement.     Entrance into the abdomen was gained with a Veress needle placed in the left upper quadrant at Thompson's point. A saline drop test was used to confirm placement into the abdomen. A pneumoperitoneum was created with a pressure of 15 mmHg. The 12 mm port was placed under Optiview technique and entry into the abdominal cavity was obtained. Once this was accomplished the Veress needle was removed. The abdomen was inspected to see if there was any injury during entrance into the abdomen. No injuries were noted. The 3 subsequent ports were then placed under direct visualization. Three 8 mm robotic ports were placed: 1 right lateral to the umbilicus and 1 in the right upper and lower quadrant. On inspection of the umbilical defect it was noted to be approximately 1cm in size. Once the ports were all placed, the robot was docked without complication. The instruments were all placed within the abdominal cavity under direct visualization. The umbilical defect was identified, was noted to be approximately 1 cm in diameter. The surrounding preperitoneal fatty tissue was then dissected free with electrocautery in order to skeletonize the fascial defect. Preperitoneal fat and the hernia sac was reduced and dissected off the umbilical defect. Once this was completed the insufflation was placed down to 8 mmHg and the umbilical defect was reapproximated using a 0 V-Loc stitch. Given the small size of the defect the decision was made to perform a meshless primary repair only. We then closed the peritoneal pocket with running 2-0 PDS V-loc suture. The instruments were removed and the robot was undocked. The 12 mm port site was closed with 0-Vicryl stitch with a Lockitron suture needle passer. We then performed a bilateral TAP block under direct visualization using 20ml of 0.5% plain marcaine per side. The remaining ports were then removed. Dermabond was placed over the incision.      The patient tolerated the procedure

## 2023-08-29 NOTE — DISCHARGE INSTRUCTIONS
Patient Discharge Instructions  Discharge Date:  8/29/2023    HYGEINE: [place] to shower in 24hrs, no soaking in a tub/pool until seen at your follow up appointment. Clean incision daily with gentle soap and water, do not use alcohol/peroxide or any harsh cleansers. DRIVING: No driving while taking narcotic medications or while in pain, abstain from critical decision making for 24hrs until anesthesia wears off. ACTIVITY: No lifting greater than 10lbs for 6 weeks or any strenuous activity until you are cleared by Dr. Azam Braden. DIET: Resume your normal diet as tolerated. MEDICATIONS: Take all medications as prescribed. SPECIAL INSTRUCTIONS:     Follow-up   Follow up with Dr. Azam Braden in 1 week. If you don't already have a scheduled  appointment, please call the office. Call Your Doctor If Any of the Following Occurs   Monitor your recovery once you leave the hospital. If any of the following occur, call your doctor:   Signs of infection, including fever above 100.5F    Redness, swelling, increasing pain, excessive bleeding, or any discharge from the incision site   Persistent nausea and/or vomiting   Pain that you can't control with the medications you've been given   Shortness of breath and/or chest pain   Tachycardia (racing heart sensation) unrelieved by rest  Pain, redness and/or swelling in your feet or legs    Sudden onset of severe left shoulder pain or severe abdominal pain  Any symptoms that are causing you concern   In case of an emergency, call 911 immediately. No alcoholic beverages, no driving or operating machinery, no making important decisions for 24 hours. You may have a normal diet but should eat lightly day of surgery. Drink plenty of fluids.   Urinate within 8 hours after surgery, if unable to urinate call your doctor     Call your doctor for the following:   Chills   Temperature greater than 101   Pain that is not tolerable despite taking pain medicine as ordered   There is increased swelling, redness or warmth at surgical site   There is increased drainage or bleeding from surgical site   Do not remove surgical dressing unless instructed to do so by your surgeon

## 2023-08-29 NOTE — PROGRESS NOTES
[address]  PROGRESS NOTE    Shift date: 8/29/2023   Shift day: Tuesday   Shift # 1    Room # STVZ OR POOL RM/NONE   Name: Wale Garcia                Pentecostalism: [address] [place] [address] East of Religion:     Referral:  Request for pre-op support    Admit Date & Time: 8/29/2023  6:31 AM    Assessment:  Wale Garcia is a 37 y.o. female having a mass removed.  met with pt's parents and their  in the surgery waiting room. Family said mass was the size of a soccer ball and mom expressed fear/worry. Dad said he feels stoic right now. Pt's family shared some of pt's and family's history with . Visit was ended when they were taken back to see pt before she went into surgery. Their  went with them. Intervention:  Writer introduced self and title as  Writer offered space for family  to express feelings, needs, and concerns and provided a ministry presence.  assured them of prayers and continued support as needed. Outcome:  Family appeared to appreciate the opportunity to talk to . They expressed gratitude for support. Plan:  Chaplains will remain available to offer spiritual and emotional support as needed. Electronically signed by Manjeet Ramirez on 8/29/2023 at 9:42 AM.  [address] [place] [address]  [phone]        08/29/23 0584   Encounter Summary   Encounter Overview/Reason  Pre-Op   Service Provided For: Family   Referral/Consult From: Clergy/   Support System Parent; Hinduism/enmanuel community   Last Encounter  08/29/23   Complexity of Encounter Moderate   Begin Time 0830   End Time  0855   Total Time Calculated 25 min   Spiritual/Emotional needs   Type Spiritual Support   Assessment/Intervention/Outcome   Assessment Coping;Fearful;Tearful   Intervention Active listening;Explored/Affirmed feelings, thoughts, concerns;Prayer (assurance of)/[place]   Outcome Engaged in conversation;Expressed

## 2023-08-29 NOTE — OP NOTE
Operative Note  Department of Obstetrics and Gynecology  [address]       Patient: Ruddy Rodrigues   : 1980  MRN: [id number]       Acct: [de-identified]   PCP: Arabella Saleh  Date of Procedure: 23    Pre-operative Diagnosis: 37 y.o. female    Pelvic Mass   Right Ovarian Cyst  Pelvic Pressure  Umbilical Hernia  BMI 37    Post-operative Diagnosis:   Pelvic Mass   Right Ovarian Cyst  Pelvic Pressure  Umbilical Hernia  BMI 37    Procedure: Exploratory Laparotomy, Bilateral salpingectomy, Right oophorectomy, Right ovarian cystectomy with robotic assisted umbilical hernia repair by general surgery    Surgeon: Dr. Emeka Chambers  Assistants: Lucas Robles,  PGY 4, Kylie Early PGY 1    Anesthesia: general    Indications: The patient is a 37y.o.-year-old  who was diagnosed incidentally with pelvic mass  upon work up for an umbilical hernia. TVUS showed cyst measuring 13 cm x 10 cm. She admits to umbilical pain, early fullness, bloating and occasional gas. She also complained of difficulty emptying her bladder. Patient was counseled on R/B/A of medical vs surgical management. Patient was evaluated by general surgery for her umbilical hernia and decision was made to proceed with joint case. She received pre operative Lovenox and ancef. Consent form was signed and all of patients questions were answered. Procedure Details: The patient was seen in the pre-op room. The risks, benefits, complications, treatment options, and expected outcomes were discussed with the patient. The patient concurred with the proposed plan, giving informed consent. The patient was taken to the Operating Room and identified as Ruddy Rodrigues and the procedure was verified. A Time Out was held and the above information confirmed. She was prepped and draped in the usual sterile fashion in the dorsal supine position.  After the administration of general anesthesia a skin incision was made post operative period. Dr. Wilber Mercer was present for the entire operation.     Findings:  normal external genitalia on bimanual exam, fullness noted on right abdominal side with mobile mas, normal appearing uterus, normal appearing bilateral fallopian tubes, normal appearing left ovary with physiologic cysts present, right ovary with 13cm fluid filled simple appearing cyst, no nodules masses or abnormalities noted on omentum mor small bowel  Total IV fluids/Blood products:  1500 ml crystalloid  Urine Output:  400 ml clear urine  Estimated blood loss:  10 mL  Drains:  hinojosa catheter to be removed after completion of general surgeries portion  Specimens:  bilateral fallopian tubes, right ovary, and cyst   Instrument and Sponge Count: Correct  Complications:  none  Condition:  stable, case handed over to general surgery for robotic assisted umbilical hernia repair    Des Wise DO  Ob/Gyn Resident  8/29/2023, 8:58 AM

## 2023-08-29 NOTE — ANESTHESIA PRE PROCEDURE
Department of Anesthesiology  Preprocedure Note       Name:  Jessica Elias   Age:  37 y.o.  :  1980                                          MRN:  [id number]         Date:  2023      Surgeon: Pablo Rice):  MD Ravi Fernandez IV, DO    Procedure: Procedure(s):  EXPLORATORY LAPAROTOMY, REMOVAL PELVIC MASS, I.E. REMOVAL RIGHT TUBE AND OVARY, REMOVAL LEFT TUBE, POSSIBLE CANCER STAGING  UMBILICAL HERNIA REPAIR, POSSIBLE MESH    Medications prior to admission:   Prior to Admission medications    Medication Sig Start Date End Date Taking?  Authorizing Provider   Multiple Vitamins-Minerals (WOMENS MULTIVITAMIN PO) Take 1 tablet by mouth daily    Historical Provider, MD   Calcium Carb-Cholecalciferol (CALCIUM 1000 + D PO) Take 1 tablet by mouth daily    Historical Provider, MD   Continuous Blood Gluc Sensor (FREESTYLE YAMILET 2 SENSOR) MISC APPLY 1 SENSOR EVERY 14 DAYS AS DIRECTED 23   Historical Provider, MD   RELION PEN NEEDLES 31G X 6 MM MISC USE 1 ONCE DAILY WITH TOUJEO 23   Historical Provider, MD   sertraline (ZOLOFT) 50 MG tablet Take 1 tablet by mouth daily For a total of 150 mg once daily 23   Historical Provider, MD   vitamin D (CHOLECALCIFEROL) 25 MCG (1000 UT) TABS tablet Take 2 tablets by mouth daily    Historical Provider, MD   fluticasone (FLONASE) 50 MCG/ACT nasal spray 1 spray by Each Nostril route daily    Historical Provider, MD   zinc 50 MG TABS tablet Take 1 tablet by mouth daily    Historical Provider, MD   loratadine (CLARITIN) 10 MG capsule Take by mouth daily    Historical Provider, MD   aspirin 81 MG chewable tablet Take 1 tablet by mouth daily    Historical Provider, MD   hydroCHLOROthiazide (HYDRODIURIL) 12.5 MG tablet Take 1 tablet by mouth daily 23   Historical Provider, MD   REXULTI 1 MG TABS tablet Take 1 tablet by mouth daily 23   Historical Provider, MD   TOUJEO SOLOSTAR 300 UNIT/ML concentrated injection pen INJECT 10 UNITS

## 2023-08-31 LAB — SURGICAL PATHOLOGY REPORT: NORMAL

## 2023-09-07 ENCOUNTER — OFFICE VISIT (OUTPATIENT)
Dept: OBGYN CLINIC | Age: 43
End: 2023-09-07

## 2023-09-07 VITALS
SYSTOLIC BLOOD PRESSURE: 122 MMHG | WEIGHT: 233 LBS | BODY MASS INDEX: 37.45 KG/M2 | HEIGHT: 66 IN | DIASTOLIC BLOOD PRESSURE: 78 MMHG

## 2023-09-07 DIAGNOSIS — Z98.890 POSTOPERATIVE STATE: Primary | ICD-10-CM

## 2023-09-07 DIAGNOSIS — L23.9 ALLERGIC DERMATITIS: ICD-10-CM

## 2023-09-07 RX ORDER — METHYLPREDNISOLONE 4 MG/1
TABLET ORAL
Qty: 21 TABLET | Refills: 0 | Status: SHIPPED | OUTPATIENT
Start: 2023-09-07 | End: 2023-09-13

## 2023-09-07 NOTE — PATIENT INSTRUCTIONS
An oral steroid as discussed in the office today will be sent to your pharmacy for your allergic reaction. Please pick it up and take as directed. You may slowly resume all your normal daily activities. Return to the office in 4-5 weeks for a final postop checkup.

## 2023-09-07 NOTE — PROGRESS NOTES
[facility] Antoinette L[address] [place]       Date: 9/7/2023    Chief complaint:  Chief Complaint   Patient presents with    Post-Op Check     Here for post op check 08/29/23           Chintan Nicholson returns today for postop checkup after having an unremarkable exploratory laparotomy, RSO, left salpingectomy and robotic ventral herniorrhaphy by Dr. Arnaldo Fox on 8/29/2023 for right ovarian cyst and a ventral hernia. She presents today stating that she has developed a rash on her upper and lower abdomen that is red, and itchy. She has been using topical and oral Benadryl with no significant relief. It is unknown to her whether or not she has any specific allergies? She otherwise seems to be doing very well. She denies any fever, chills, nausea/vomiting, significant abdominal/pelvic discomfort, vaginal bleeding or UTI symptoms. She's having normal bowel and urinary function and ambulating with no significant difficulty. Operative findings revealed a large approximately 17 cm right ovarian cyst with a splayed fallopian tube ovary. Uterus was nongravid and without gross pathology. Left ovary was grossly normal with a small functional cyst and normal-appearing left fallopian tube. Anterior and posterior cul-de-sac was free of any gross pathology. Liver edge was smooth, no peritoneal seeding and omentum grossly appeared to be normal.  Small bowel also was run and grossly appeared to be normal.    Pathology report shows :.  8/29/23 0000    Surgical Pathology Report Path Number: [id number]     -- Diagnosis --   A. 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    Fimbriated fallopian tube with focal paratubal simple cyst, diameter   0.2 cm.     B.  Fallopian tube, left, salpingectomy:   Fimbriated

## 2023-09-28 PROBLEM — Z98.890 POST-OPERATIVE STATE: Status: RESOLVED | Noted: 2023-08-29 | Resolved: 2023-09-28

## 2023-10-12 ENCOUNTER — OFFICE VISIT (OUTPATIENT)
Dept: OBGYN CLINIC | Age: 43
End: 2023-10-12

## 2023-10-12 VITALS
BODY MASS INDEX: 35.52 KG/M2 | SYSTOLIC BLOOD PRESSURE: 122 MMHG | WEIGHT: 221 LBS | DIASTOLIC BLOOD PRESSURE: 78 MMHG | HEIGHT: 66 IN

## 2023-10-12 DIAGNOSIS — Z98.890 POSTOPERATIVE STATE: Primary | ICD-10-CM

## 2023-10-12 NOTE — PROGRESS NOTES
[facility] [place]  [address] [facility] [place]       Date: 10/12/2023    Chief complaint:  Chief Complaint   Patient presents with    Post-Op Check     Here for post op check had Lap 08/29/23     Donnie Pearce returns today for final postop checkup after having an unremarkable exploratory laparotomy, RSO, left salpingectomy and robotic ventral herniorrhaphy by Dr. Imelda Connelly on 8/29/2023 for right ovarian cyst and a ventral hernia. She denies any fever, chills, nausea/vomiting, significant abdominal/pelvic discomfort, vaginal bleeding or UTI symptoms. She's having normal bowel and urinary function and ambulating with no difficulty. Physical exam      Vitals:    10/12/23 0931   BP: 122/78   Site: Right Upper Arm   Position: Sitting   Cuff Size: Large Adult   Weight: 221 lb (100.2 kg)   Height: 5' 6\" (1.676 m)       General appearance: Patient appears to be in no significant distress. Lungs are clear to auscultation in all fields bilaterally without wheezes, rales or rhonchi. Cardiac exam with normal sinus rhythm and rate without murmurs. The abdomen has a well healed incisions and is non-tender without signs of infection. There is no organomegaly or CVAT. Bowel sounds are normally active. Extremities nontender without edema. Assessment/Plan:     ICD-10-CM    1. Postoperative state  Z98.890             She has not had an annual GYN exam since 2019 and was instructed to RTO within the next 6 months or as needed. Darylene Franklin Donneta Amble, MD, [facility], [facility].   [address] [facility]

## 2023-10-12 NOTE — PROGRESS NOTES
Alejandrina Marrero returns today for postop checkup after having an unremarkable exploratory laparotomy, RSO, left salpingectomy and robotic ventral herniorrhaphy by Dr. Valentin Viera on 8/29/2023 for right ovarian cyst and a ventral hernia. She presents today stating that she has developed a rash on her upper and lower abdomen that is red, and itchy. She has been using topical and oral Benadryl with no significant relief. It is unknown to her whether or not she has any specific allergies? She otherwise seems to be doing very well. She denies any fever, chills, nausea/vomiting, significant abdominal/pelvic discomfort, vaginal bleeding or UTI symptoms. She's having normal bowel and urinary function and ambulating with no significant difficulty. Operative findings revealed a large approximately 17 cm right ovarian cyst with a splayed fallopian tube ovary. Uterus was nongravid and without gross pathology. Left ovary was grossly normal with a small functional cyst and normal-appearing left fallopian tube. Anterior and posterior cul-de-sac was free of any gross pathology. Liver edge was smooth, no peritoneal seeding and omentum grossly appeared to be normal.  Small bowel also was run and grossly appeared to be normal.     Pathology report shows :.  8/29/23 0000     Surgical Pathology Report Path Number: [id number]     -- Diagnosis --   A. 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 Fimbriated fallopian tube with focal paratubal simple cyst, diameter   0.2 cm.     B.  Fallopian tube, left, salpingectomy:   Fimbriated fallopian tube, no specific pathologic change. Separate fragments of adipose tissue with hemorrhage and mesothelial   hyperplasia.        Winnie Vera, Giancarlo Talley   **Electronically Signed Out**         /8/31/2023

## 2023-10-12 NOTE — PATIENT INSTRUCTIONS
If not already done, you may return to all your normal daily activities. Stay safe, healthy and return to the office for an annual exam within the next 6 months.

## 2024-04-16 ENCOUNTER — HOSPITAL ENCOUNTER (OUTPATIENT)
Age: 44
Setting detail: SPECIMEN
Discharge: HOME | End: 2024-04-16

## 2024-04-16 ENCOUNTER — OFFICE VISIT (OUTPATIENT)
Dept: OBGYN CLINIC | Age: 44
End: 2024-04-16
Payer: COMMERCIAL

## 2024-04-16 VITALS — SYSTOLIC BLOOD PRESSURE: 110 MMHG | DIASTOLIC BLOOD PRESSURE: 70 MMHG | WEIGHT: 229 LBS | BODY MASS INDEX: 36.96 KG/M2

## 2024-04-16 DIAGNOSIS — N93.9 ABNORMAL UTERINE BLEEDING: ICD-10-CM

## 2024-04-16 DIAGNOSIS — E07.9 THYROID DISEASE: ICD-10-CM

## 2024-04-16 DIAGNOSIS — Z12.31 VISIT FOR SCREENING MAMMOGRAM: ICD-10-CM

## 2024-04-16 DIAGNOSIS — B97.7 HPV IN FEMALE: ICD-10-CM

## 2024-04-16 DIAGNOSIS — Z85.850 HISTORY OF THYROID CANCER: ICD-10-CM

## 2024-04-16 DIAGNOSIS — Z01.419 ENCOUNTER FOR GYNECOLOGICAL EXAMINATION WITHOUT ABNORMAL FINDING: Primary | ICD-10-CM

## 2024-04-16 PROCEDURE — 99396 PREV VISIT EST AGE 40-64: CPT | Performed by: OBSTETRICS & GYNECOLOGY

## 2024-04-16 NOTE — PATIENT INSTRUCTIONS
We will get some blood work done to check your thyroid.  We will let you know that result as well as today's Pap smear.  Please read the information given to you on Nexplanon and NovaSure endometrial ablation.  Please contact the office when you have made a decision on treating your abnormal cycles.

## 2024-04-16 NOTE — PROGRESS NOTES
[facility]  [facility]  [address]  [address]  [facility] [facility] [address]       DATE OF VISIT:  24        History and Physical    Kenya Barbour    :  1980  CHIEF COMPLAINT:    Chief Complaint   Patient presents with    Annual Exam     Here for annual last pap 23 neg hpv HR+  having period every 2 weeks last mammogram 24 neg                     HPI :   Kenya Barbour is a 44 y.o. femaleGRAVIDA 2 [address]A [address]1   PCP: Megan Claros    Kenya Barbour returns today for her annual exam.  She is due for mammogram in July.  She presents today stating that she has been having irregular cycles since her oophorectomy.  She is having bleeding episodes and varying amounts every 2 weeks.  Ranges from light to moderate flow.  She is also on Synthroid but is not had TFTs in about 1 year.  She is having regular bowel movements without constipation or diarrhea.  She denies any UTI symptoms or involuntary loss of urine.  She is otherwise without any significant complaints today.  _____________________________________________________________________  Past Medical History:   Diagnosis Date    Diabetes mellitus (HCC)     Environmental allergies     Hx of thyroid cancer     Hyperlipidemia     Hypertension     Ovarian cyst     PONV (postoperative nausea and vomiting)     Thyroid disease     Wellness examination     Dr Megan Claros; last visit 23                                                                   Past Surgical History:   Procedure Laterality Date    ABDOMINAL EXPLORATION SURGERY  2023    Removal of pelvic mass; REMOVAL RIGHT TUBE AND OVARY, REMOVAL LEFT TUBE; UMBILICAL HERNIA REPAIR    CHOLECYSTECTOMY N/A     HYSTERECTOMY, TOTAL ABDOMINAL (CERVIX REMOVED) N/A 2023    EXPLORATORY LAPAROTOMY, REMOVAL PELVIC MASS, I.E. REMOVAL RIGHT TUBE AND OVARY, REMOVAL LEFT TUBE performed by Girma Prado,

## 2024-04-19 ASSESSMENT — PATIENT HEALTH QUESTIONNAIRE - PHQ9
1. LITTLE INTEREST OR PLEASURE IN DOING THINGS: NOT AT ALL
2. FEELING DOWN, DEPRESSED OR HOPELESS: NOT AT ALL
SUM OF ALL RESPONSES TO PHQ QUESTIONS 1-9: 0
SUM OF ALL RESPONSES TO PHQ9 QUESTIONS 1 & 2: 0
SUM OF ALL RESPONSES TO PHQ QUESTIONS 1-9: 0

## 2024-04-30 LAB — CYTOLOGY REPORT: NORMAL

## 2024-05-16 ENCOUNTER — INITIAL CONSULT (OUTPATIENT)
Dept: OBGYN CLINIC | Age: 44
End: 2024-05-16
Payer: COMMERCIAL

## 2024-05-16 VITALS — DIASTOLIC BLOOD PRESSURE: 80 MMHG | WEIGHT: 229 LBS | BODY MASS INDEX: 36.96 KG/M2 | SYSTOLIC BLOOD PRESSURE: 118 MMHG

## 2024-05-16 DIAGNOSIS — N92.6 IRREGULAR MENSES: Primary | ICD-10-CM

## 2024-05-16 PROCEDURE — 99214 OFFICE O/P EST MOD 30 MIN: CPT | Performed by: OBSTETRICS & GYNECOLOGY

## 2024-05-16 NOTE — PATIENT INSTRUCTIONS
Please carefully follow all the preoperative instructions given to you.  Remember not to take  Motrin, aspirin or related products regularly within 5 days of your scheduled surgery.  You may be discharged the day of surgery or the following day as a routine.  Plan on returning to the office 1-2 weeks after hospital discharge.  Please call the office if you have any questions or concerns before or after surgery.

## 2024-05-16 NOTE — PROGRESS NOTES
by mouth daily      fluticasone (FLONASE) 50 MCG/ACT nasal spray 1 spray by Each Nostril route daily      zinc 50 MG TABS tablet Take 1 tablet by mouth daily      loratadine (CLARITIN) 10 MG capsule Take by mouth daily      aspirin 81 MG chewable tablet Take 1 tablet by mouth daily      hydroCHLOROthiazide (HYDRODIURIL) 12.5 MG tablet Take 1 tablet by mouth daily      REXULTI 1 MG TABS tablet Take 1 tablet by mouth daily      TOUJEO SOLOSTAR 300 UNIT/ML concentrated injection pen INJECT 10 UNITS SUBCUTANEOUSLY ONCE DAILY      SYNTHROID 200 MCG tablet Take 1 tablet by mouth daily      lisinopril (PRINIVIL;ZESTRIL) 5 MG tablet Take 1 tablet by mouth daily      metFORMIN (GLUCOPHAGE) 500 MG tablet Take 1 tablet by mouth 2 times daily      sertraline (ZOLOFT) 100 MG tablet Take 1 tablet by mouth daily For a total of 150 mg daily      simvastatin (ZOCOR) 40 MG tablet Take 1 tablet by mouth nightly       No current facility-administered medications for this visit.       Allergies:  No Known Allergies    Gynecologic History:  Patient's last menstrual period was 2024.  Sexually Active: Yes  STD History: Yes, HR-HPV  Abnormal Pap History yes  Birth Control: No  Family History of Breast, Ovarian, Colon or Uterine Cancer: no    OB History    Para Term  AB Living   2 2 1 1 0 1   SAB IAB Ectopic Molar Multiple Live Births   0 0 0 0 0 2     ______________________________________________________________________  REVIEW OF SYSTEMS:        Constitutional:  Unexpected weight change no  Neurological:  Frequent headaches  no  Ophthalmic:  Recent visual changes no  ENT:   Difficulty swallowing  no  Breast:              Masses   no     Respiratory:  Shortness of breath  no    Cardiovascular: Chest pain   no     Gastrointestinal: Chronic diarrhea/constipation no   Urogenital:  Urinary incontinence  no                                         Heavy/irregular periods           yes

## 2024-06-27 ENCOUNTER — ANESTHESIA EVENT (OUTPATIENT)
Dept: OPERATING ROOM | Age: 44
End: 2024-06-27
Payer: COMMERCIAL

## 2024-06-27 NOTE — H&P
[facility], [facility]  [address]  [address]  [facility] [address]        DATE 2024           History and Physical     Kenya Barbour    :  1980    Kenya Barbour is a 44 y.o. femaleGRAVIDA 2 PARA 1011   PCP: Megan Claros     Kenya Barbour was seen 2024 for her annual exam.  She presented stating that she has been having irregular cycles since her oophorectomy last August.  She was having bleeding episodes and varying amounts every 2 weeks.  Ranges from light to moderate flow.  She is also on Synthroid but had not had TFTs in about 1 year.  TFTs were ordered and WNL.  She is having regular bowel movements without constipation or diarrhea.  She denies any UTI symptoms or involuntary loss of urine.  She was counseled and given information on endometrial ablation and she desires to proceed.  _____________________________________________________________________  Past Medical History        Past Medical History:   Diagnosis Date    Diabetes mellitus (HCC)      Environmental allergies      Hx of thyroid cancer      Hyperlipidemia      Hypertension      Ovarian cyst      PONV (postoperative nausea and vomiting)      Thyroid disease      Wellness examination       Dr Megan Claros; last visit 23                                                                      Past Surgical History         Past Surgical History:   Procedure Laterality Date    ABDOMINAL EXPLORATION SURGERY   2023     Removal of pelvic mass; REMOVAL RIGHT TUBE AND OVARY, REMOVAL LEFT TUBE; UMBILICAL HERNIA REPAIR    CHOLECYSTECTOMY N/A     HYSTERECTOMY, TOTAL ABDOMINAL (CERVIX REMOVED) N/A 2023     EXPLORATORY LAPAROTOMY, REMOVAL PELVIC MASS, I.E. REMOVAL RIGHT TUBE AND OVARY, REMOVAL LEFT TUBE performed by Girma Prado MD at [facility]    MOLE REMOVAL Left       breast    SALPINGECTOMY Left 2023

## 2024-06-28 ENCOUNTER — HOSPITAL ENCOUNTER (OUTPATIENT)
Age: 44
Setting detail: OUTPATIENT SURGERY
Discharge: HOME | End: 2024-06-28
Attending: OBSTETRICS & GYNECOLOGY | Admitting: OBSTETRICS & GYNECOLOGY
Payer: COMMERCIAL

## 2024-06-28 ENCOUNTER — ANESTHESIA (OUTPATIENT)
Dept: OPERATING ROOM | Age: 44
End: 2024-06-28
Payer: COMMERCIAL

## 2024-06-28 VITALS
DIASTOLIC BLOOD PRESSURE: 75 MMHG | TEMPERATURE: 97.2 F | HEART RATE: 68 BPM | OXYGEN SATURATION: 95 % | HEIGHT: 66 IN | BODY MASS INDEX: 36.8 KG/M2 | WEIGHT: 229 LBS | SYSTOLIC BLOOD PRESSURE: 115 MMHG | RESPIRATION RATE: 16 BRPM

## 2024-06-28 DIAGNOSIS — N92.6 IRREGULAR MENSES: ICD-10-CM

## 2024-06-28 DIAGNOSIS — N93.9 ABNORMAL UTERINE BLEEDING: ICD-10-CM

## 2024-06-28 PROBLEM — Z98.890 HISTORY OF ENDOMETRIAL ABLATION: Status: ACTIVE | Noted: 2024-06-28

## 2024-06-28 LAB
BUN BLD-MCNC: 12 MG/DL (ref 8–26)
CA-I BLD-SCNC: 1.22 MMOL/L (ref 1.15–1.33)
CHLORIDE BLD-SCNC: 105 MMOL/L (ref 98–107)
EGFR, POC: >90 ML/MIN/1.73M2
GLUCOSE BLD-MCNC: 176 MG/DL (ref 65–105)
GLUCOSE BLD-MCNC: 193 MG/DL (ref 74–100)
HCG, PREGNANCY URINE (POC): NEGATIVE
HCT VFR BLD AUTO: 42 % (ref 36–46)
POC CREATININE: 0.5 MG/DL (ref 0.51–1.19)
POC HEMOGLOBIN (CALC): 14.2 G/DL (ref 12–16)
POC LACTIC ACID: 1.4 MMOL/L (ref 0.56–1.39)
POTASSIUM BLD-SCNC: 4.1 MMOL/L (ref 3.5–4.5)
SODIUM BLD-SCNC: 139 MMOL/L (ref 138–146)

## 2024-06-28 PROCEDURE — 82435 ASSAY OF BLOOD CHLORIDE: CPT

## 2024-06-28 PROCEDURE — 3700000000 HC ANESTHESIA ATTENDED CARE: Performed by: OBSTETRICS & GYNECOLOGY

## 2024-06-28 PROCEDURE — 2500000003 HC RX 250 WO HCPCS

## 2024-06-28 PROCEDURE — 88305 TISSUE EXAM BY PATHOLOGIST: CPT

## 2024-06-28 PROCEDURE — 7100000000 HC PACU RECOVERY - FIRST 15 MIN: Performed by: OBSTETRICS & GYNECOLOGY

## 2024-06-28 PROCEDURE — 2709999900 HC NON-CHARGEABLE SUPPLY: Performed by: OBSTETRICS & GYNECOLOGY

## 2024-06-28 PROCEDURE — 3700000001 HC ADD 15 MINUTES (ANESTHESIA): Performed by: OBSTETRICS & GYNECOLOGY

## 2024-06-28 PROCEDURE — 82565 ASSAY OF CREATININE: CPT

## 2024-06-28 PROCEDURE — 82330 ASSAY OF CALCIUM: CPT

## 2024-06-28 PROCEDURE — 2720000010 HC SURG SUPPLY STERILE: Performed by: OBSTETRICS & GYNECOLOGY

## 2024-06-28 PROCEDURE — 6360000002 HC RX W HCPCS

## 2024-06-28 PROCEDURE — 3600000004 HC SURGERY LEVEL 4 BASE: Performed by: OBSTETRICS & GYNECOLOGY

## 2024-06-28 PROCEDURE — 3600000014 HC SURGERY LEVEL 4 ADDTL 15MIN: Performed by: OBSTETRICS & GYNECOLOGY

## 2024-06-28 PROCEDURE — 84132 ASSAY OF SERUM POTASSIUM: CPT

## 2024-06-28 PROCEDURE — 2580000003 HC RX 258: Performed by: OBSTETRICS & GYNECOLOGY

## 2024-06-28 PROCEDURE — 84520 ASSAY OF UREA NITROGEN: CPT

## 2024-06-28 PROCEDURE — 81025 URINE PREGNANCY TEST: CPT

## 2024-06-28 PROCEDURE — 7100000011 HC PHASE II RECOVERY - ADDTL 15 MIN: Performed by: OBSTETRICS & GYNECOLOGY

## 2024-06-28 PROCEDURE — 85014 HEMATOCRIT: CPT

## 2024-06-28 PROCEDURE — 83605 ASSAY OF LACTIC ACID: CPT

## 2024-06-28 PROCEDURE — 2580000003 HC RX 258

## 2024-06-28 PROCEDURE — 7100000001 HC PACU RECOVERY - ADDTL 15 MIN: Performed by: OBSTETRICS & GYNECOLOGY

## 2024-06-28 PROCEDURE — 6360000002 HC RX W HCPCS: Performed by: ANESTHESIOLOGY

## 2024-06-28 PROCEDURE — 7100000010 HC PHASE II RECOVERY - FIRST 15 MIN: Performed by: OBSTETRICS & GYNECOLOGY

## 2024-06-28 PROCEDURE — 84295 ASSAY OF SERUM SODIUM: CPT

## 2024-06-28 PROCEDURE — 82947 ASSAY GLUCOSE BLOOD QUANT: CPT

## 2024-06-28 RX ORDER — IBUPROFEN 600 MG/1
600 TABLET ORAL EVERY 6 HOURS PRN
Qty: 40 TABLET | Refills: 1 | Status: SHIPPED | OUTPATIENT
Start: 2024-06-28

## 2024-06-28 RX ORDER — SUCCINYLCHOLINE/SOD CL,ISO/PF 100 MG/5ML
SYRINGE (ML) INTRAVENOUS PRN
Status: DISCONTINUED | OUTPATIENT
Start: 2024-06-28 | End: 2024-06-28 | Stop reason: SDUPTHER

## 2024-06-28 RX ORDER — PROPOFOL 10 MG/ML
INJECTION, EMULSION INTRAVENOUS PRN
Status: DISCONTINUED | OUTPATIENT
Start: 2024-06-28 | End: 2024-06-28 | Stop reason: SDUPTHER

## 2024-06-28 RX ORDER — SENNA AND DOCUSATE SODIUM 50; 8.6 MG/1; MG/1
1 TABLET, FILM COATED ORAL 2 TIMES DAILY
Qty: 60 TABLET | Refills: 0 | Status: SHIPPED | OUTPATIENT
Start: 2024-06-28

## 2024-06-28 RX ORDER — ONDANSETRON 4 MG/1
4 TABLET, FILM COATED ORAL EVERY 8 HOURS PRN
Qty: 30 TABLET | Refills: 1 | Status: SHIPPED | OUTPATIENT
Start: 2024-06-28

## 2024-06-28 RX ORDER — ONDANSETRON 2 MG/ML
INJECTION INTRAMUSCULAR; INTRAVENOUS PRN
Status: DISCONTINUED | OUTPATIENT
Start: 2024-06-28 | End: 2024-06-28 | Stop reason: SDUPTHER

## 2024-06-28 RX ORDER — SODIUM CHLORIDE 0.9 % (FLUSH) 0.9 %
5-40 SYRINGE (ML) INJECTION PRN
Status: DISCONTINUED | OUTPATIENT
Start: 2024-06-28 | End: 2024-06-28 | Stop reason: HOSPADM

## 2024-06-28 RX ORDER — DEXAMETHASONE SODIUM PHOSPHATE 10 MG/ML
INJECTION, SOLUTION INTRAMUSCULAR; INTRAVENOUS PRN
Status: DISCONTINUED | OUTPATIENT
Start: 2024-06-28 | End: 2024-06-28 | Stop reason: SDUPTHER

## 2024-06-28 RX ORDER — FENTANYL CITRATE 50 UG/ML
INJECTION, SOLUTION INTRAMUSCULAR; INTRAVENOUS PRN
Status: DISCONTINUED | OUTPATIENT
Start: 2024-06-28 | End: 2024-06-28 | Stop reason: SDUPTHER

## 2024-06-28 RX ORDER — FENTANYL CITRATE 50 UG/ML
25 INJECTION, SOLUTION INTRAMUSCULAR; INTRAVENOUS EVERY 5 MIN PRN
Status: DISCONTINUED | OUTPATIENT
Start: 2024-06-28 | End: 2024-06-28 | Stop reason: HOSPADM

## 2024-06-28 RX ORDER — SODIUM CHLORIDE, SODIUM LACTATE, POTASSIUM CHLORIDE, CALCIUM CHLORIDE 600; 310; 30; 20 MG/100ML; MG/100ML; MG/100ML; MG/100ML
INJECTION, SOLUTION INTRAVENOUS CONTINUOUS PRN
Status: DISCONTINUED | OUTPATIENT
Start: 2024-06-28 | End: 2024-06-28 | Stop reason: SDUPTHER

## 2024-06-28 RX ORDER — NALOXONE HYDROCHLORIDE 0.4 MG/ML
INJECTION, SOLUTION INTRAMUSCULAR; INTRAVENOUS; SUBCUTANEOUS PRN
Status: DISCONTINUED | OUTPATIENT
Start: 2024-06-28 | End: 2024-06-28 | Stop reason: HOSPADM

## 2024-06-28 RX ORDER — MIDAZOLAM HYDROCHLORIDE 1 MG/ML
INJECTION INTRAMUSCULAR; INTRAVENOUS PRN
Status: DISCONTINUED | OUTPATIENT
Start: 2024-06-28 | End: 2024-06-28 | Stop reason: SDUPTHER

## 2024-06-28 RX ORDER — METOCLOPRAMIDE HYDROCHLORIDE 5 MG/ML
10 INJECTION INTRAMUSCULAR; INTRAVENOUS
Status: DISCONTINUED | OUTPATIENT
Start: 2024-06-28 | End: 2024-06-28 | Stop reason: HOSPADM

## 2024-06-28 RX ORDER — DIPHENHYDRAMINE HYDROCHLORIDE 50 MG/ML
INJECTION INTRAMUSCULAR; INTRAVENOUS PRN
Status: DISCONTINUED | OUTPATIENT
Start: 2024-06-28 | End: 2024-06-28 | Stop reason: SDUPTHER

## 2024-06-28 RX ORDER — PROCHLORPERAZINE EDISYLATE 5 MG/ML
5 INJECTION INTRAMUSCULAR; INTRAVENOUS
Status: DISCONTINUED | OUTPATIENT
Start: 2024-06-28 | End: 2024-06-28 | Stop reason: HOSPADM

## 2024-06-28 RX ORDER — DIMENHYDRINATE 50 MG
50 TABLET ORAL ONCE
Status: DISCONTINUED | OUTPATIENT
Start: 2024-06-28 | End: 2024-06-28 | Stop reason: HOSPADM

## 2024-06-28 RX ORDER — SODIUM CHLORIDE, SODIUM LACTATE, POTASSIUM CHLORIDE, CALCIUM CHLORIDE 600; 310; 30; 20 MG/100ML; MG/100ML; MG/100ML; MG/100ML
INJECTION, SOLUTION INTRAVENOUS CONTINUOUS
Status: DISCONTINUED | OUTPATIENT
Start: 2024-06-28 | End: 2024-06-28 | Stop reason: HOSPADM

## 2024-06-28 RX ORDER — MAGNESIUM HYDROXIDE 1200 MG/15ML
LIQUID ORAL CONTINUOUS PRN
Status: DISCONTINUED | OUTPATIENT
Start: 2024-06-28 | End: 2024-06-28 | Stop reason: HOSPADM

## 2024-06-28 RX ORDER — SODIUM CHLORIDE 0.9 % (FLUSH) 0.9 %
5-40 SYRINGE (ML) INJECTION EVERY 12 HOURS SCHEDULED
Status: DISCONTINUED | OUTPATIENT
Start: 2024-06-28 | End: 2024-06-28 | Stop reason: HOSPADM

## 2024-06-28 RX ORDER — LABETALOL HYDROCHLORIDE 5 MG/ML
10 INJECTION, SOLUTION INTRAVENOUS
Status: DISCONTINUED | OUTPATIENT
Start: 2024-06-28 | End: 2024-06-28 | Stop reason: HOSPADM

## 2024-06-28 RX ORDER — LIDOCAINE HYDROCHLORIDE 10 MG/ML
INJECTION, SOLUTION EPIDURAL; INFILTRATION; INTRACAUDAL; PERINEURAL PRN
Status: DISCONTINUED | OUTPATIENT
Start: 2024-06-28 | End: 2024-06-28 | Stop reason: SDUPTHER

## 2024-06-28 RX ORDER — SODIUM CHLORIDE 9 MG/ML
INJECTION, SOLUTION INTRAVENOUS PRN
Status: DISCONTINUED | OUTPATIENT
Start: 2024-06-28 | End: 2024-06-28 | Stop reason: HOSPADM

## 2024-06-28 RX ORDER — ACETAMINOPHEN 500 MG
1000 TABLET ORAL EVERY 6 HOURS PRN
Qty: 30 TABLET | Refills: 1 | Status: SHIPPED | OUTPATIENT
Start: 2024-06-28

## 2024-06-28 RX ADMIN — DEXAMETHASONE SODIUM PHOSPHATE 10 MG: 10 INJECTION, SOLUTION INTRAMUSCULAR; INTRAVENOUS at 08:09

## 2024-06-28 RX ADMIN — FENTANYL CITRATE 25 MCG: 50 INJECTION, SOLUTION INTRAMUSCULAR; INTRAVENOUS at 09:48

## 2024-06-28 RX ADMIN — ONDANSETRON 4 MG: 2 INJECTION INTRAMUSCULAR; INTRAVENOUS at 08:22

## 2024-06-28 RX ADMIN — DIPHENHYDRAMINE HYDROCHLORIDE 6.25 MG: 50 INJECTION INTRAMUSCULAR; INTRAVENOUS at 08:26

## 2024-06-28 RX ADMIN — MIDAZOLAM 2 MG: 1 INJECTION INTRAMUSCULAR; INTRAVENOUS at 07:57

## 2024-06-28 RX ADMIN — FENTANYL CITRATE 100 MCG: 50 INJECTION, SOLUTION INTRAMUSCULAR; INTRAVENOUS at 08:00

## 2024-06-28 RX ADMIN — LIDOCAINE HYDROCHLORIDE 50 MG: 10 INJECTION, SOLUTION EPIDURAL; INFILTRATION; INTRACAUDAL; PERINEURAL at 08:00

## 2024-06-28 RX ADMIN — SODIUM CHLORIDE, POTASSIUM CHLORIDE, SODIUM LACTATE AND CALCIUM CHLORIDE: 600; 310; 30; 20 INJECTION, SOLUTION INTRAVENOUS at 07:54

## 2024-06-28 RX ADMIN — PROPOFOL 300 MG: 10 INJECTION, EMULSION INTRAVENOUS at 08:00

## 2024-06-28 RX ADMIN — Medication 100 MG: at 08:00

## 2024-06-28 ASSESSMENT — PAIN SCALES - GENERAL
PAINLEVEL_OUTOF10: 3
PAINLEVEL_OUTOF10: 3
PAINLEVEL_OUTOF10: 8

## 2024-06-28 ASSESSMENT — PAIN DESCRIPTION - ORIENTATION: ORIENTATION: LOWER

## 2024-06-28 ASSESSMENT — PAIN - FUNCTIONAL ASSESSMENT: PAIN_FUNCTIONAL_ASSESSMENT: 0-10

## 2024-06-28 ASSESSMENT — PAIN DESCRIPTION - LOCATION: LOCATION: ABDOMEN

## 2024-06-28 ASSESSMENT — PAIN DESCRIPTION - DESCRIPTORS: DESCRIPTORS: CRAMPING

## 2024-06-28 NOTE — OP NOTE
Operative Note  Department of Obstetrics and Gynecology        Patient: Kenya Barbour   : 1980  MRN: [id number]       Acct: [id number]   PCP: Megan Claros MD  Date of Procedure: 24    Pre-operative Diagnosis: 44 y.o. female         Post-operative Diagnosis:   Abnormal Uterine Bleeding   History of Bilateral Salpingectomy  History of Right Oophorectomy   BMI 36    Procedure:   Abnormal Uterine Bleeding   History of Bilateral Salpingectomy  History of Right Oophorectomy   BMI 36    Surgeon: Dr. Prado  Assistants: Rancho Currie MD, PGY2    Anesthesia: general    Indications:   Kenya Barbour 44 y.o. female presents for surgical assessment and management of abnormal uterine bleeding. She has a history of bilateral salpingectomy. Recent ultrasound reveals an approximately 10 cm uterus. She would like to proceed with surgical evaluation and management at this time.     Procedure Details:   The patient was seen in the pre-op room. The risks, benefits, complications, treatment options, and expected outcomes were discussed with the patient. The patient concurred with the proposed plan, giving informed consent. The patient was taken to the Operating Room and identified as Kenya Barbour and the procedure was verified. A Time Out was held and the above information confirmed.     After administration of adequate general  anesthesia. She was placed in the dorsolithotomy position with yellow fin stirrups and prepped and draped in the usual sterile fashion.     A weighted speculum was inserted into the posterior vaginal fornix. The anterior cervical lip was grasped with a single-tooth tenaculum. The uterine sound was used to measuring 9 cm. The cervix was then dilated with angelita dilators. Aveta Hysteroscopy was carried out revealing a normal endocervical and endometrial cavity. A sharp curettage was then carried out with endometrial curettings sent to pathology. The uterine cavity was  measured to be 5 cm.     NovaSure endometrial ablation procedure was then performed, the device was introduced into the uterus.  The uterine cavity length was entered on the instrument panel as 5 cm. The array was deployed, and the width was registered as 2.7 cm which was entered into the instrument panel also.  The cavity assessment was successfully completed followed by initiation of the ablation cycle which was completed without difficulty in 1 minutes and 26 seconds at a power of 160 vargas. The novasure device was then removed.    Repeat hysteroscopy showed adequate ablation of the endometrium. All the vaginal instruments were removed and hemostasis at the site of the tenaculum was noted.     Instrument, sponge, and needle counts were correct at the conclusion of the case.  SCDs for DVT prophylaxis remain in place for the post operative period.       Findings:  Uterus sounded to 9 cm and was anteverted. Normal appearing external genitalia without lesions. Normal appearing vagina and cervix without lesions. Normal appearing endocervical canal without polyps or fibroids. Normal appearing endometrial canal without polyps or fibroids. Bilateral tubal ostia visualized and normal appearing.   Estimated Blood Loss: < 5 ml  Drains:  None  Total IV Fluids: 550 ml  Urine output: n/a  Specimens:  Endometrial Curettings  Instrument and Sponge Count: Correct  Complications: none  Condition: stable, transfer to post anesthesia recovery    Rancho Currie MD, PGY2  [facility]   6/28/2024, 8:51 AM

## 2024-06-28 NOTE — ANESTHESIA PRE PROCEDURE
Topics   • Alcohol use: Not Currently                                Counseling given: Not Answered      Vital Signs (Current):   Vitals:    06/25/24 1518   Weight: 103.9 kg (229 lb)   Height: 1.676 m (5' 6\")                                              BP Readings from Last 3 Encounters:   05/16/24 118/80   04/16/24 110/70   10/12/23 122/78       NPO Status:                                                                                 BMI:   Wt Readings from Last 3 Encounters:   06/25/24 103.9 kg (229 lb)   05/16/24 103.9 kg (229 lb)   04/16/24 103.9 kg (229 lb)     Body mass index is 36.96 kg/m².    CBC:   Lab Results   Component Value Date/Time    WBC 6.2 08/15/2023 12:39 PM    RBC 4.77 08/15/2023 12:39 PM    HGB 15.0 08/15/2023 12:39 PM    HCT 43.8 08/15/2023 12:39 PM    MCV 91.8 08/15/2023 12:39 PM    RDW 12.3 08/15/2023 12:39 PM     08/15/2023 12:39 PM       CMP:   Lab Results   Component Value Date/Time     08/15/2023 12:39 PM    K 4.4 08/15/2023 12:39 PM     08/15/2023 12:39 PM    CO2 23 08/15/2023 12:39 PM    BUN 9 08/15/2023 12:39 PM    CREATININE 0.7 08/15/2023 12:39 PM    LABGLOM >60 08/15/2023 12:39 PM    GLUCOSE 356 08/15/2023 12:39 PM    CALCIUM 9.3 08/15/2023 12:39 PM    BILITOT 0.3 08/15/2023 12:39 PM    ALKPHOS 77 08/15/2023 12:39 PM    AST 20 08/15/2023 12:39 PM    ALT 28 08/15/2023 12:39 PM       POC Tests: No results for input(s): \"POCGLU\", \"POCNA\", \"POCK\", \"POCCL\", \"POCBUN\", \"POCHEMO\", \"POCHCT\" in the last 72 hours.    Coags: No results found for: \"PROTIME\", \"INR\", \"APTT\"    HCG (If Applicable):   Lab Results   Component Value Date    PREGTESTUR NEGATIVE 08/15/2023    HCGQUANT <1.0 08/15/2023        ABGs: No results found for: \"PHART\", \"PO2ART\", \"MHM7JGE\", \"KIE5LMO\", \"BEART\", \"D3HFMNCO\"     Type & Screen (If Applicable):  No results found for: \"LABABO\"    Drug/Infectious Status (If Applicable):  No results found for: \"HIV\", \"HEPCAB\"    COVID-19 Screening (If Applicable): No

## 2024-06-28 NOTE — DISCHARGE INSTRUCTIONS
No alcoholic beverages, no driving or operating machinery, no making important decisions for 24 hours.   You may have a normal diet but should eat lightly day of surgery.  Drink plenty of fluids.  Urinate within 8 hours after surgery, if unable to urinate call your doctor    Call your doctor for the following:   Chills   Temperature greater than 101   Pain that is not tolerable despite taking pain medicine as ordered   There is increased swelling, redness or warmth at surgical site   There is increased drainage or bleeding from surgical site   Do not remove surgical dressing unless instructed to do so by your surgeon

## 2024-06-28 NOTE — ANESTHESIA POSTPROCEDURE EVALUATION
Department of Anesthesiology  Postprocedure Note    Patient: Kenya Barbour  MRN: [id number]  YOB: 1980  Date of evaluation: 6/28/2024    Procedure Summary       Date: 06/28/24 Room / Location: [address]    Anesthesia Start: 0755 Anesthesia Stop: 0850    Procedure: DILATATION AND CURETTAGE HYSTEROSCOPY, ENDOMETRIAL ABLATION Diagnosis:       Abnormal uterine bleeding      Irregular menses      (Abnormal uterine bleeding [N93.9])      (Irregular menses [N92.6])    Surgeons: Girma Prado MD Responsible Provider: Hemant Dickey MD    Anesthesia Type: general ASA Status: 2            Anesthesia Type: No value filed.    Shelly Phase I: Shelly Score: 9    Shelly Phase II: Shelly Score: 10    Anesthesia Post Evaluation    Patient location during evaluation: PACU  Patient participation: complete - patient participated  Level of consciousness: awake and alert  Airway patency: patent  Nausea & Vomiting: no nausea and no vomiting  Cardiovascular status: blood pressure returned to baseline  Respiratory status: acceptable  Hydration status: euvolemic  Comments: No known anesthesia related complication  Multimodal analgesia pain management approach  Pain management: adequate    No notable events documented.

## 2024-07-02 LAB — SURGICAL PATHOLOGY REPORT: NORMAL

## 2024-07-24 NOTE — PROGRESS NOTES
[facility], [facility] - [place]  [address]  [address]  [place] [address]       Date: 7/25/2024    Chief complaint:  Chief Complaint   Patient presents with    Post-Op Check     Her for post op check            Kenya Barbour returns today for postop checkup after having an unremarkable hysteroscopy, D&C and endometrial ablation on 6/28/2024 for irregular menses.  She denies any fever, chills, nausea/vomiting, significant abdominal/pelvic discomfort, vaginal bleeding or UTI symptoms.  She's having normal bowel and urinary function and ambulating with no significant difficulty.      Operative findings revealed: Uterus sounded to 9 cm and was anteverted. Normal appearing external genitalia without lesions. Normal appearing vagina and cervix without lesions. Normal appearing endocervical canal without polyps or fibroids. Normal appearing endometrial canal without polyps or fibroids. Bilateral tubal ostia visualized and normal appearing.   Pathology report shows :    /28/24 0000    Surgical Pathology Report Path Number: [id number]    -- Diagnosis --  A. ENDOMETRIUM, CURETTAGE:  Secretory endometrium. Negative for atypia  and carcinoma. Separate fragments of unremarkable cervix.      Pam Avalos M.D.  **Electronically Signed Out**        kmg2/7/2/2024     Clinical Information  Pre-Op Diagnosis:  ABNORMAL UTERINE BLEEDING, IRREGULAR MENSES  Operative Findings:  ENDOMETRIAL CURETTINGS  Operation Performed:  DILATATION AND CURETTAGE HYSTEROSCOPY,  ENDOMETRIAL ABLATION    .    Physical exam      Vitals:    07/25/24 1431   BP: 122/78   Site: Right Upper Arm   Position: Sitting   Cuff Size: Large Adult   Weight: 103 kg (227 lb)        General appearance: Patient appears to be in no significant distress.  Lungs are clear to auscultation in all fields bilaterally without wheezes, rales or rhonchi.  Cardiac exam with normal sinus rhythm and rate without

## 2024-07-24 NOTE — PATIENT INSTRUCTIONS
If not already done you may resume all your normal daily activities.  Return to the office in April 2025 for your annual.  Enjoy the rest of your summer!

## 2024-07-25 ENCOUNTER — OFFICE VISIT (OUTPATIENT)
Dept: OBGYN CLINIC | Age: 44
End: 2024-07-25

## 2024-07-25 VITALS — SYSTOLIC BLOOD PRESSURE: 122 MMHG | BODY MASS INDEX: 36.64 KG/M2 | DIASTOLIC BLOOD PRESSURE: 78 MMHG | WEIGHT: 227 LBS

## 2024-07-25 DIAGNOSIS — Z98.890 POSTOPERATIVE STATE: Primary | ICD-10-CM

## 2024-07-25 DIAGNOSIS — Z98.890 HISTORY OF ENDOMETRIAL ABLATION: ICD-10-CM

## 2024-07-25 PROCEDURE — 99024 POSTOP FOLLOW-UP VISIT: CPT | Performed by: OBSTETRICS & GYNECOLOGY

## 2024-07-25 RX ORDER — TIRZEPATIDE 5 MG/.5ML
INJECTION, SOLUTION SUBCUTANEOUS
COMMUNITY
Start: 2024-07-19

## 2025-04-21 ENCOUNTER — HOSPITAL ENCOUNTER (OUTPATIENT)
Age: 45
Setting detail: SPECIMEN
Discharge: HOME | End: 2025-04-21

## 2025-04-21 ENCOUNTER — OFFICE VISIT (OUTPATIENT)
Dept: OBGYN CLINIC | Age: 45
End: 2025-04-21
Payer: COMMERCIAL

## 2025-04-21 VITALS
WEIGHT: 199 LBS | SYSTOLIC BLOOD PRESSURE: 121 MMHG | BODY MASS INDEX: 31.98 KG/M2 | OXYGEN SATURATION: 98 % | HEIGHT: 66 IN | DIASTOLIC BLOOD PRESSURE: 83 MMHG | HEART RATE: 90 BPM

## 2025-04-21 DIAGNOSIS — Z01.419 ENCOUNTER FOR GYNECOLOGICAL EXAMINATION: Primary | ICD-10-CM

## 2025-04-21 PROCEDURE — 99396 PREV VISIT EST AGE 40-64: CPT | Performed by: NURSE PRACTITIONER

## 2025-04-21 SDOH — ECONOMIC STABILITY: FOOD INSECURITY: WITHIN THE PAST 12 MONTHS, YOU WORRIED THAT YOUR FOOD WOULD RUN OUT BEFORE YOU GOT MONEY TO BUY MORE.: NEVER TRUE

## 2025-04-21 SDOH — ECONOMIC STABILITY: FOOD INSECURITY: WITHIN THE PAST 12 MONTHS, THE FOOD YOU BOUGHT JUST DIDN'T LAST AND YOU DIDN'T HAVE MONEY TO GET MORE.: NEVER TRUE

## 2025-04-21 ASSESSMENT — PATIENT HEALTH QUESTIONNAIRE - PHQ9
SUM OF ALL RESPONSES TO PHQ QUESTIONS 1-9: 6
SUM OF ALL RESPONSES TO PHQ QUESTIONS 1-9: 6
2. FEELING DOWN, DEPRESSED OR HOPELESS: SEVERAL DAYS
3. TROUBLE FALLING OR STAYING ASLEEP: SEVERAL DAYS
6. FEELING BAD ABOUT YOURSELF - OR THAT YOU ARE A FAILURE OR HAVE LET YOURSELF OR YOUR FAMILY DOWN: SEVERAL DAYS
SUM OF ALL RESPONSES TO PHQ QUESTIONS 1-9: 6
1. LITTLE INTEREST OR PLEASURE IN DOING THINGS: MORE THAN HALF THE DAYS
7. TROUBLE CONCENTRATING ON THINGS, SUCH AS READING THE NEWSPAPER OR WATCHING TELEVISION: NOT AT ALL
SUM OF ALL RESPONSES TO PHQ QUESTIONS 1-9: 6
8. MOVING OR SPEAKING SO SLOWLY THAT OTHER PEOPLE COULD HAVE NOTICED. OR THE OPPOSITE, BEING SO FIGETY OR RESTLESS THAT YOU HAVE BEEN MOVING AROUND A LOT MORE THAN USUAL: NOT AT ALL
10. IF YOU CHECKED OFF ANY PROBLEMS, HOW DIFFICULT HAVE THESE PROBLEMS MADE IT FOR YOU TO DO YOUR WORK, TAKE CARE OF THINGS AT HOME, OR GET ALONG WITH OTHER PEOPLE: NOT DIFFICULT AT ALL
4. FEELING TIRED OR HAVING LITTLE ENERGY: SEVERAL DAYS
9. THOUGHTS THAT YOU WOULD BE BETTER OFF DEAD, OR OF HURTING YOURSELF: NOT AT ALL
5. POOR APPETITE OR OVEREATING: NOT AT ALL

## 2025-04-21 ASSESSMENT — ENCOUNTER SYMPTOMS
RESPIRATORY NEGATIVE: 1
BACK PAIN: 0
ABDOMINAL DISTENTION: 0
SHORTNESS OF BREATH: 0
COUGH: 0
GASTROINTESTINAL NEGATIVE: 1
ALLERGIC/IMMUNOLOGIC NEGATIVE: 1

## 2025-04-21 NOTE — PROGRESS NOTES
I.E. REMOVAL RIGHT TUBE AND OVARY, REMOVAL LEFT TUBE performed by Girma Prado MD at [facility] OR NO HYSTERCETOMY    MOLE REMOVAL Left     breast    SALPINGECTOMY Left 08/29/2023    SALPINGO-OOPHORECTOMY Right 08/29/2023    THYROIDECTOMY Right 2012    partial    THYROIDECTOMY Left 2012    partial    UMBILICAL HERNIA REPAIR N/A 08/29/2023    UMBILICAL HERNIA REPAIR, TAP BLOCK performed by Keith Giraldo IV, DO at [facility] OR     Family History   Problem Relation Age of Onset    Stroke Mother     Diabetes Mother     No Known Problems Father     Breast Cancer Neg Hx     Ovarian Cancer Neg Hx     Uterine Cancer Neg Hx     Endometrial Cancer Neg Hx     Cervical Cancer Neg Hx     Vaginal Cancer Neg Hx     Colon Cancer Neg Hx      Social History     Tobacco Use   Smoking Status Never   Smokeless Tobacco Never     Social History     Substance and Sexual Activity   Alcohol Use Not Currently     Current Outpatient Medications   Medication Sig Dispense Refill    MOUNJARO 5 MG/0.5ML SOPN SC injection INJECT 1 SYRINGE SUBCUTANEOUSLY ONCE A WEEK      sennosides-docusate sodium (SENOKOT-S) 8.6-50 MG tablet Take 1 tablet by mouth in the morning and at bedtime 60 tablet 0    acetaminophen (TYLENOL) 500 MG tablet Take 2 tablets by mouth every 6 hours as needed for Pain 30 tablet 1    ibuprofen (ADVIL;MOTRIN) 600 MG tablet Take 1 tablet by mouth every 6 hours as needed for Pain 40 tablet 1    ondansetron (ZOFRAN) 4 MG tablet Take 1 tablet by mouth every 8 hours as needed for Nausea or Vomiting 30 tablet 1    Multiple Vitamins-Minerals (WOMENS MULTIVITAMIN PO) Take 1 tablet by mouth daily      Calcium Carb-Cholecalciferol (CALCIUM 1000 + D PO) Take 1 tablet by mouth daily      Continuous Blood Gluc Sensor (FREESTYLE YAMILET 2 SENSOR) MISC APPLY 1 SENSOR EVERY 14 DAYS AS DIRECTED      RELION PEN NEEDLES 31G X 6 MM MISC USE 1 ONCE DAILY WITH TOUJEO      vitamin D (CHOLECALCIFEROL) 25 MCG (1000 UT) TABS tablet Take 2 tablets by mouth

## 2025-04-24 ENCOUNTER — RESULTS FOLLOW-UP (OUTPATIENT)
Dept: OBGYN CLINIC | Age: 45
End: 2025-04-24

## 2025-04-24 NOTE — RESULT ENCOUNTER NOTE
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  Pap is still pending.  Further management will be decided when those results are available

## 2025-04-29 LAB — CYTOLOGY REPORT: NORMAL

## (undated) DEVICE — GLOVE ORTHO 7   MSG9470

## (undated) DEVICE — LIQUIBAND RAPID ADHESIVE 36/CS 0.8ML: Brand: MEDLINE

## (undated) DEVICE — PACK PROCEDURE SURG FACILITY SPEC GI BWL SPT SVMMC

## (undated) DEVICE — INSUFFLATION NEEDLE TO ESTABLISH PNEUMOPERITONEUM.: Brand: INSUFFLATION NEEDLE

## (undated) DEVICE — SPONGE LAP W18XL18IN WHT COT 4 PLY FLD STRUNG RADPQ DISP ST 2 PER PACK

## (undated) DEVICE — SYSTEM WST MGMT AVETA

## (undated) DEVICE — UNDERPANTS MAT L/XL KNIT SEAMLESS CLR CODE WAISTBAND

## (undated) DEVICE — PACK SURG ABD SVMMC

## (undated) DEVICE — SUTURE PERMAHAND SZ 2-0 L12X18IN NONABSORBABLE BLK SILK A185H

## (undated) DEVICE — NEPTUNE E-SEP SMOKE EVACUATION PENCIL, COATED, 70MM BLADE, PUSH BUTTON SWITCH: Brand: NEPTUNE E-SEP

## (undated) DEVICE — DEVICE ABLATION NOVASUREHANDLE DISP

## (undated) DEVICE — SYSTEM WST MGMT W/ CAP AVETA

## (undated) DEVICE — SUTURE V-LOC 180 SZ 0 L9IN ABSRB GRN GS-21 L37MM 1/2 CIR VLOCL0346

## (undated) DEVICE — SUTURE VCRL + SZ 1 L36IN ABSRB UD L36MM CT-1 1/2 CIR VCP947H

## (undated) DEVICE — COVER OR TBL W40XL90IN ABSRB STD AND GRIPPY BK SAHARA

## (undated) DEVICE — SUTURE ABSORBABLE BRAIDED 2-0 CT-1 27 IN UD VICRYL J259H

## (undated) DEVICE — ELECTRODE PT RET AD L9FT HI MOIST COND ADH HYDRGEL CORDED

## (undated) DEVICE — BLADELESS OBTURATOR: Brand: WECK VISTA

## (undated) DEVICE — PAD,SANITARY,11 IN,MAXI,W/WINGS,N-STRL: Brand: MEDLINE

## (undated) DEVICE — INSUFFLATION TUBING SET WITH FILTER, FUNNEL CONNECTOR AND LUER LOCK: Brand: JOSNOE MEDICAL INC

## (undated) DEVICE — SOLUTION SCRB 4OZ 2% CHG FOR SURG SCRBBING HND WSH

## (undated) DEVICE — LEGGINGS, PAIR, 31X48, STERILE: Brand: MEDLINE

## (undated) DEVICE — DRAPE,UNDRBUT,WHT GRAD PCH,CAPPORT,20/CS: Brand: MEDLINE

## (undated) DEVICE — BLADE,CARBON-STEEL,15,STRL,DISPOSABLE,TB: Brand: MEDLINE

## (undated) DEVICE — BLADE,CARBON-STEEL,11,STRL,DISPOSABLE,TB: Brand: MEDLINE

## (undated) DEVICE — GLOVE ORANGE PI 8   MSG9080

## (undated) DEVICE — LIGASURE IMPACT FT10 COMPATIBLE: Brand: MEDLINE

## (undated) DEVICE — SUTURE COAT VCRL SZ 0 L18IN ABSRB UD W/O NDL POLYGLACTIN J112T

## (undated) DEVICE — GAUZE,SPONGE,FLUFF,6"X6.75",STRL,5/TRAY: Brand: MEDLINE

## (undated) DEVICE — SCISSOR SURG METZ CRV TIP

## (undated) DEVICE — SUTURE STRATAFIX SPRL PDS + SZ 0 L9IN ABSRB VLT CT-1 L36MM SXPP1B455

## (undated) DEVICE — GLOVE SURG SZ 6 THK91MIL LTX FREE SYN POLYISOPRENE ANTI

## (undated) DEVICE — STERILE POLYISOPRENE POWDER-FREE SURGICAL GLOVES WITH EMOLLIENT COATING: Brand: PROTEXIS

## (undated) DEVICE — SOLUTION ANTIFOG VIS SYS CLEARIFY LAPSCP

## (undated) DEVICE — STRAP,POSITIONING,KNEE/BODY,FOAM,4X60": Brand: MEDLINE

## (undated) DEVICE — SYRINGE,CONTROL,LL,FINGER,GRIP: Brand: MEDLINE INDUSTRIES, INC.

## (undated) DEVICE — TOWEL,OR,DSP,ST,NATURAL,DLX,4/PK,20PK/CS: Brand: MEDLINE

## (undated) DEVICE — SUTURE VCRL SZ 0 L27IN ABSRB UD L36MM CT-1 1/2 CIR J260H

## (undated) DEVICE — SUTURE PERMAHAND SZ 0 L18IN NONABSORBABLE BLK SILK BRAID A186H

## (undated) DEVICE — PAD,NON-ADHERENT,3X8,STERILE,LF,1/PK: Brand: MEDLINE

## (undated) DEVICE — SHEET,DRAPE,70X100,STERILE: Brand: MEDLINE

## (undated) DEVICE — SUTURE VCRL SZ 2-0 L27IN ABSRB UD L26MM SH 1/2 CIR J417H

## (undated) DEVICE — APPLICATOR MEDICATED 26 CC SOLUTION HI LT ORNG CHLORAPREP

## (undated) DEVICE — CONTAINER,SPECIMEN,4OZ,OR STRL: Brand: MEDLINE

## (undated) DEVICE — ARM DRAPE

## (undated) DEVICE — SOLUTION IV 1000ML 0.9% SOD CHL PH 5 INJ USP VIAFLX PLAS

## (undated) DEVICE — DRAPE,REIN 53X77,STERILE: Brand: MEDLINE

## (undated) DEVICE — UNDERPANTS MAT L XL SEAMLESS CLR CODE WAISTBAND KNIT

## (undated) DEVICE — COVER,LIGHT HANDLE,FLX,2/PK: Brand: MEDLINE INDUSTRIES, INC.

## (undated) DEVICE — DEVICE TRCR 12X9X3IN WHT CLSR DISP OMNICLOSE

## (undated) DEVICE — SVMMC GYN MIN PK

## (undated) DEVICE — DRAPE, SLUSH XL, 44X66, STERILE: Brand: MEDLINE

## (undated) DEVICE — STRAP ARMBRD W1.5XL32IN FOAM STR YET SFT W/ HK AND LOOP

## (undated) DEVICE — DRAPE,LAP,CHOLE,W/TROUGHS,STERILE: Brand: MEDLINE

## (undated) DEVICE — HYSTEROSCOPE RIGID CORAL AVETA DISP

## (undated) DEVICE — SEAL

## (undated) DEVICE — TIP COVER ACCESSORY

## (undated) DEVICE — COUNTER NDL 10 COUNT HLD 20 FOAM BLK SGL MAG

## (undated) DEVICE — SUTURE MCRYL SZ 4-0 L18IN ABSRB UD L16MM PC-3 3/8 CIR PRIM Y845G

## (undated) DEVICE — 1LYRTR 16FR10ML100%SIL UMS SNP: Brand: MEDLINE INDUSTRIES, INC.

## (undated) DEVICE — GOWN,AURORA,NONREINFORCED,LARGE: Brand: MEDLINE

## (undated) DEVICE — NEPTUNE E-SEP 165MM SUCTION SLEEVE: Brand: NEPTUNE E-SEP

## (undated) DEVICE — Device

## (undated) DEVICE — GARMENT,MEDLINE,DVT,INT,CALF,MED, GEN2: Brand: MEDLINE

## (undated) DEVICE — TROCAR: Brand: KII FIOS FIRST ENTRY